# Patient Record
Sex: FEMALE | Race: WHITE | NOT HISPANIC OR LATINO | URBAN - METROPOLITAN AREA
[De-identification: names, ages, dates, MRNs, and addresses within clinical notes are randomized per-mention and may not be internally consistent; named-entity substitution may affect disease eponyms.]

---

## 2023-10-24 ENCOUNTER — HOSPITAL ENCOUNTER (OUTPATIENT)
Dept: RADIOLOGY | Facility: EXTERNAL LOCATION | Age: 58
Discharge: HOME | End: 2023-10-24
Payer: MEDICARE

## 2023-10-25 ENCOUNTER — HOSPITAL ENCOUNTER (OUTPATIENT)
Dept: RADIOLOGY | Facility: HOSPITAL | Age: 58
Discharge: HOME | End: 2023-10-25
Payer: MEDICARE

## 2023-10-25 ENCOUNTER — HOSPITAL ENCOUNTER (OUTPATIENT)
Dept: RADIOLOGY | Facility: HOSPITAL | Age: 58
End: 2023-10-25
Payer: MEDICARE

## 2023-10-25 DIAGNOSIS — N64.89 OTHER SPECIFIED DISORDERS OF BREAST: ICD-10-CM

## 2023-10-25 PROCEDURE — 77061 BREAST TOMOSYNTHESIS UNI: CPT | Mod: LT

## 2023-10-25 PROCEDURE — 77065 DX MAMMO INCL CAD UNI: CPT | Mod: LEFT SIDE | Performed by: RADIOLOGY

## 2023-10-25 PROCEDURE — G0279 TOMOSYNTHESIS, MAMMO: HCPCS | Mod: LEFT SIDE | Performed by: RADIOLOGY

## 2023-11-27 PROBLEM — M23.301 DEGENERATIVE TEAR OF LATERAL MENISCUS OF LEFT KNEE: Status: ACTIVE | Noted: 2018-04-04

## 2023-11-27 PROBLEM — M70.61 TROCHANTERIC BURSITIS OF RIGHT HIP: Status: ACTIVE | Noted: 2023-06-21

## 2023-11-27 PROBLEM — E66.9 OBESITY: Status: ACTIVE | Noted: 2023-11-27

## 2023-11-27 PROBLEM — E66.01 SEVERE OBESITY (BMI >= 40) (MULTI): Status: ACTIVE | Noted: 2023-05-01

## 2023-11-27 PROBLEM — M79.18 MYOFASCIAL PAIN: Status: ACTIVE | Noted: 2023-05-01

## 2023-11-27 PROBLEM — F41.9 ANXIETY: Status: ACTIVE | Noted: 2023-11-27

## 2023-11-27 PROBLEM — M19.90 ARTHRITIS: Status: ACTIVE | Noted: 2023-11-27

## 2023-11-27 PROBLEM — M54.16 LUMBAR RADICULOPATHY: Status: ACTIVE | Noted: 2023-04-30

## 2023-11-27 PROBLEM — G89.29 CHRONIC BACK PAIN: Status: ACTIVE | Noted: 2023-11-27

## 2023-11-27 PROBLEM — L98.9 SKIN LESION OF BACK: Status: ACTIVE | Noted: 2019-01-03

## 2023-11-27 PROBLEM — F17.200 TOBACCO DEPENDENCE: Status: ACTIVE | Noted: 2023-11-27

## 2023-11-27 PROBLEM — E66.9 OBESITY WITH BODY MASS INDEX 30 OR GREATER: Status: ACTIVE | Noted: 2023-11-27

## 2023-11-27 PROBLEM — M48.061 SPINAL STENOSIS OF LUMBAR REGION: Status: ACTIVE | Noted: 2023-04-30

## 2023-11-27 PROBLEM — M79.2 NEUROPATHIC PAIN: Status: ACTIVE | Noted: 2023-05-01

## 2023-11-27 PROBLEM — E78.5 DYSLIPIDEMIA: Status: ACTIVE | Noted: 2023-11-27

## 2023-11-27 PROBLEM — M54.50 CHRONIC LOW BACK PAIN: Status: ACTIVE | Noted: 2023-11-27

## 2023-11-27 PROBLEM — R94.31 ELECTROCARDIOGRAM ABNORMAL: Status: ACTIVE | Noted: 2023-11-27

## 2023-11-27 PROBLEM — F17.210 CONTINUOUS DEPENDENCE ON CIGARETTE SMOKING: Status: ACTIVE | Noted: 2023-04-30

## 2023-11-27 PROBLEM — F17.210 CIGARETTE SMOKER: Status: ACTIVE | Noted: 2023-11-27

## 2023-11-27 PROBLEM — G47.00 INSOMNIA: Status: ACTIVE | Noted: 2023-11-27

## 2023-11-27 PROBLEM — M54.9 CHRONIC BACK PAIN: Status: ACTIVE | Noted: 2023-11-27

## 2023-11-27 PROBLEM — S80.12XA HEMATOMA OF LEFT LOWER EXTREMITY: Status: ACTIVE | Noted: 2019-03-18

## 2023-11-27 PROBLEM — G89.29 CHRONIC LOW BACK PAIN: Status: ACTIVE | Noted: 2023-11-27

## 2023-11-27 PROBLEM — M25.561 RIGHT KNEE PAIN: Status: ACTIVE | Noted: 2023-11-27

## 2023-11-27 PROBLEM — M47.26 OTHER SPONDYLOSIS WITH RADICULOPATHY, LUMBAR REGION: Status: ACTIVE | Noted: 2023-04-30

## 2023-11-27 PROBLEM — M77.02 MEDIAL EPICONDYLITIS OF ELBOW, LEFT: Status: ACTIVE | Noted: 2018-09-05

## 2023-11-27 PROBLEM — D25.0 FIBROIDS, SUBMUCOSAL: Status: ACTIVE | Noted: 2017-11-02

## 2023-11-27 RX ORDER — CHLORHEXIDINE GLUCONATE ORAL RINSE 1.2 MG/ML
SOLUTION DENTAL
COMMUNITY
Start: 2023-08-02 | End: 2023-11-28 | Stop reason: WASHOUT

## 2023-11-27 RX ORDER — LANOLIN ALCOHOL/MO/W.PET/CERES
CREAM (GRAM) TOPICAL
COMMUNITY
Start: 2021-09-07 | End: 2023-11-28 | Stop reason: WASHOUT

## 2023-11-27 RX ORDER — HYDROCODONE BITARTRATE AND ACETAMINOPHEN 5; 325 MG/1; MG/1
TABLET ORAL
COMMUNITY
End: 2023-11-28 | Stop reason: WASHOUT

## 2023-11-27 RX ORDER — IBUPROFEN 200 MG
TABLET ORAL EVERY 24 HOURS
COMMUNITY
Start: 2022-08-04 | End: 2023-11-28 | Stop reason: WASHOUT

## 2023-11-27 RX ORDER — ZOLPIDEM TARTRATE 10 MG/1
1 TABLET ORAL NIGHTLY PRN
COMMUNITY
Start: 2016-07-02 | End: 2024-02-27 | Stop reason: ALTCHOICE

## 2023-11-27 RX ORDER — DULOXETIN HYDROCHLORIDE 60 MG/1
60 CAPSULE, DELAYED RELEASE ORAL
COMMUNITY
Start: 2023-05-25

## 2023-11-27 RX ORDER — SIMETHICONE 80 MG
80 TABLET,CHEWABLE ORAL
COMMUNITY
Start: 2015-07-31 | End: 2024-02-27 | Stop reason: ALTCHOICE

## 2023-11-27 RX ORDER — NIRMATRELVIR AND RITONAVIR 300-100 MG
KIT ORAL
COMMUNITY
Start: 2023-02-06 | End: 2023-11-28 | Stop reason: WASHOUT

## 2023-11-27 RX ORDER — ATORVASTATIN CALCIUM 20 MG/1
20 TABLET, FILM COATED ORAL
COMMUNITY
Start: 2023-05-25 | End: 2023-11-28 | Stop reason: WASHOUT

## 2023-11-27 RX ORDER — BUPROPION HYDROCHLORIDE 150 MG/1
150 TABLET, EXTENDED RELEASE ORAL
COMMUNITY
Start: 2016-07-02 | End: 2023-11-28 | Stop reason: WASHOUT

## 2023-11-27 RX ORDER — ERGOCALCIFEROL 1.25 MG/1
50000 CAPSULE ORAL WEEKLY
COMMUNITY
Start: 2022-09-26

## 2023-11-27 RX ORDER — IBUPROFEN 600 MG/1
600 TABLET ORAL
COMMUNITY
Start: 2016-07-02 | End: 2023-11-28 | Stop reason: WASHOUT

## 2023-11-27 RX ORDER — CLOBETASOL PROPIONATE 0.5 MG/G
CREAM TOPICAL
COMMUNITY
Start: 2023-10-10 | End: 2023-11-28 | Stop reason: WASHOUT

## 2023-11-27 RX ORDER — TRAMADOL HYDROCHLORIDE AND ACETAMINOPHEN 37.5; 325 MG/1; MG/1
1 TABLET, FILM COATED ORAL
COMMUNITY
Start: 2016-06-02 | End: 2023-11-28 | Stop reason: WASHOUT

## 2023-11-27 RX ORDER — DULOXETIN HYDROCHLORIDE 60 MG/1
1 CAPSULE, DELAYED RELEASE ORAL EVERY 24 HOURS
COMMUNITY
End: 2023-11-28 | Stop reason: WASHOUT

## 2023-11-27 RX ORDER — MELOXICAM 15 MG/1
TABLET ORAL EVERY 24 HOURS
COMMUNITY
End: 2023-11-28 | Stop reason: WASHOUT

## 2023-11-27 RX ORDER — KETOROLAC TROMETHAMINE 10 MG/1
1 TABLET, FILM COATED ORAL EVERY 6 HOURS PRN
COMMUNITY
Start: 2023-04-06 | End: 2023-11-28 | Stop reason: WASHOUT

## 2023-11-27 RX ORDER — MELOXICAM 15 MG/1
15 TABLET ORAL
COMMUNITY
Start: 2023-05-25

## 2023-11-27 RX ORDER — AMITRIPTYLINE HYDROCHLORIDE 25 MG/1
25 TABLET, FILM COATED ORAL
COMMUNITY
Start: 2016-07-02 | End: 2023-11-28 | Stop reason: WASHOUT

## 2023-11-27 RX ORDER — COPPER GLUCONATE 2 MG
TABLET ORAL
COMMUNITY
Start: 2020-02-06

## 2023-11-27 RX ORDER — DULOXETIN HYDROCHLORIDE 30 MG/1
CAPSULE, DELAYED RELEASE ORAL
COMMUNITY
Start: 2023-03-29 | End: 2023-11-28 | Stop reason: WASHOUT

## 2023-11-27 RX ORDER — ATORVASTATIN CALCIUM 20 MG/1
TABLET, FILM COATED ORAL EVERY 24 HOURS
COMMUNITY
End: 2024-02-05 | Stop reason: SDUPTHER

## 2023-11-27 RX ORDER — OXYCODONE AND ACETAMINOPHEN 5; 325 MG/1; MG/1
1 TABLET ORAL EVERY 8 HOURS PRN
COMMUNITY
Start: 2023-09-06 | End: 2023-11-28 | Stop reason: WASHOUT

## 2023-11-27 RX ORDER — VARENICLINE TARTRATE 0.5 (11)-1
KIT ORAL
COMMUNITY
End: 2023-11-28 | Stop reason: WASHOUT

## 2023-11-27 RX ORDER — METHYLPREDNISOLONE 4 MG/1
TABLET ORAL
COMMUNITY
Start: 2023-06-21 | End: 2023-11-28 | Stop reason: WASHOUT

## 2023-11-27 RX ORDER — GABAPENTIN 400 MG/1
400 CAPSULE ORAL 3 TIMES DAILY
COMMUNITY
Start: 2023-02-22 | End: 2023-11-28 | Stop reason: WASHOUT

## 2023-11-27 RX ORDER — ERGOCALCIFEROL 1.25 MG/1
50000 CAPSULE ORAL WEEKLY
COMMUNITY
Start: 2022-09-26 | End: 2023-11-28 | Stop reason: WASHOUT

## 2023-11-27 RX ORDER — CYCLOBENZAPRINE HCL 10 MG
10 TABLET ORAL EVERY 8 HOURS PRN
COMMUNITY
Start: 2023-04-06 | End: 2023-11-28 | Stop reason: WASHOUT

## 2023-11-27 RX ORDER — VARENICLINE TARTRATE 1 MG/1
1 TABLET, FILM COATED ORAL 2 TIMES DAILY
COMMUNITY
Start: 2023-08-02

## 2023-11-27 RX ORDER — MIRTAZAPINE 30 MG/1
TABLET, FILM COATED ORAL
COMMUNITY

## 2023-11-27 RX ORDER — PREDNISONE 20 MG/1
TABLET ORAL
COMMUNITY
Start: 2023-01-10 | End: 2023-11-28 | Stop reason: WASHOUT

## 2023-11-27 RX ORDER — SIMVASTATIN 10 MG/1
10 TABLET, FILM COATED ORAL
COMMUNITY
Start: 2016-06-02 | End: 2024-02-27 | Stop reason: ALTCHOICE

## 2023-11-27 RX ORDER — ESCITALOPRAM OXALATE 20 MG/1
20 TABLET ORAL
COMMUNITY
Start: 2023-10-19

## 2023-11-28 RX ORDER — VITAMIN E MIXED 400 UNIT
CAPSULE ORAL DAILY
COMMUNITY

## 2023-11-28 RX ORDER — TIZANIDINE HYDROCHLORIDE 4 MG/1
4 CAPSULE, GELATIN COATED ORAL 3 TIMES DAILY
COMMUNITY
End: 2024-02-27 | Stop reason: ALTCHOICE

## 2023-11-28 RX ORDER — TURMERIC 400 MG
CAPSULE ORAL
COMMUNITY

## 2023-11-28 RX ORDER — MELATONIN 1 MG
TABLET,CHEWABLE ORAL
COMMUNITY

## 2023-11-29 ENCOUNTER — OFFICE VISIT (OUTPATIENT)
Dept: SURGERY | Facility: CLINIC | Age: 58
End: 2023-11-29
Payer: MEDICARE

## 2023-11-29 VITALS
HEIGHT: 65 IN | DIASTOLIC BLOOD PRESSURE: 70 MMHG | HEART RATE: 56 BPM | WEIGHT: 181 LBS | TEMPERATURE: 97.7 F | BODY MASS INDEX: 30.16 KG/M2 | SYSTOLIC BLOOD PRESSURE: 130 MMHG

## 2023-11-29 DIAGNOSIS — R10.13 EPIGASTRIC PAIN: ICD-10-CM

## 2023-11-29 DIAGNOSIS — F17.200 TOBACCO DEPENDENCE: ICD-10-CM

## 2023-11-29 DIAGNOSIS — Z12.11 SCREENING FOR COLON CANCER: Primary | ICD-10-CM

## 2023-11-29 PROCEDURE — 99204 OFFICE O/P NEW MOD 45 MIN: CPT | Performed by: SURGERY

## 2023-11-29 PROCEDURE — 3008F BODY MASS INDEX DOCD: CPT | Performed by: SURGERY

## 2023-11-29 PROCEDURE — 3075F SYST BP GE 130 - 139MM HG: CPT | Performed by: SURGERY

## 2023-11-29 PROCEDURE — 3078F DIAST BP <80 MM HG: CPT | Performed by: SURGERY

## 2023-11-29 RX ORDER — PANTOPRAZOLE SODIUM 40 MG/1
40 TABLET, DELAYED RELEASE ORAL
Qty: 30 TABLET | Refills: 11 | Status: SHIPPED | OUTPATIENT
Start: 2023-11-29 | End: 2024-11-28

## 2023-11-29 ASSESSMENT — ENCOUNTER SYMPTOMS: ABDOMINAL PAIN: 1

## 2023-11-29 NOTE — PATIENT INSTRUCTIONS
I prescribed you an oral medication for your stomach you have a smoker and you also take aspirin which would explain why you are having some pain.  Will schedule you for your colonoscopy and EGD on March 7, 2024.  You will require a 2-day prep for your colonoscopy

## 2023-11-29 NOTE — PROGRESS NOTES
Subjective   Patient ID: Buffy Damon is a 58 y.o. female who presents for abdominal pain.    HPI     This patient presented with epigastric pain she has nonspecific discomfort.  She is a heavy smoker and takes oral aspirin.  She has had no melena.  She has had no other abdominal complaints except for nonspecific abdominal discomfort.  She had a colonoscopy on January 20, 2021 which confirmed a single polyp but a poor prep recommendations made for follow-up colonoscopy in 3 years.    History reviewed. No pertinent past medical history.     Current Outpatient Medications on File Prior to Visit   Medication Sig Dispense Refill    atorvastatin (Lipitor) 20 mg tablet once every 24 hours.      copper gluconate 2 mg tablet       DULoxetine (Cymbalta) 60 mg DR capsule Take 1 capsule (60 mg) by mouth once daily.      ergocalciferol (Vitamin D-2) 1.25 MG (53668 UT) capsule Take 1 capsule (50,000 Units) by mouth once a week.      escitalopram (Lexapro) 20 mg tablet Take 1 tablet (20 mg) by mouth once daily.      melatonin 1 mg tablet,chewable Chew.      meloxicam (Mobic) 15 mg tablet Take 1 tablet (15 mg) by mouth once daily.      mirtazapine (Remeron) 30 mg tablet       simethicone (Mylicon) 80 mg chewable tablet Chew 1 tablet (80 mg).      simvastatin (Zocor) 10 mg tablet Take 1 tablet (10 mg) by mouth.      tiZANidine (Zanaflex) 4 mg capsule Take 1 capsule (4 mg) by mouth 3 times a day.      turmeric 400 mg capsule Take by mouth.      varenicline (Chantix) 1 mg tablet Take 1 tablet (1 mg) by mouth twice a day.      vitamin E 450 mg (1000 unit) capsule Take by mouth once daily.      zolpidem (Ambien) 10 mg tablet Take 1 tablet (10 mg) by mouth as needed at bedtime.      [DISCONTINUED] amitriptyline (Elavil) 25 mg tablet Take 1 tablet (25 mg) by mouth.      [DISCONTINUED] atorvastatin (Lipitor) 20 mg tablet Take 1 tablet (20 mg) by mouth once daily.      [DISCONTINUED] buPROPion SR (Wellbutrin SR) 150 mg 12 hr tablet Take 1  tablet (150 mg) by mouth.      [DISCONTINUED] chlorhexidine (Peridex) 0.12 % solution RINSE WITH 15 ML BY MOUTH NIGHT BEFORE SURGERY AND MORNING BEFORE SUREGERY      [DISCONTINUED] clobetasol (Temovate) 0.05 % cream APPLY 1 APPLICATION TO AFFECTED AREAS UP TO TWICE DAILY MON. - FRI. HOLD WEEKENDS, AVOID FACE      [DISCONTINUED] cyanocobalamin (Vitamin B-12) 1,000 mcg tablet       [DISCONTINUED] cyclobenzaprine (Flexeril) 10 mg tablet Take 1 tablet (10 mg) by mouth every 8 hours if needed.      [DISCONTINUED] DULoxetine (Cymbalta) 30 mg DR capsule TAKE 1 CAPSULE BY MOUTH DAILY FOR 1ST WEEK FROM 2ND WEEK TAKE 60 MG DAILY      [DISCONTINUED] DULoxetine (Cymbalta) 60 mg DR capsule 1 capsule (60 mg) once every 24 hours.      [DISCONTINUED] ergocalciferol (Vitamin D-2) 1.25 MG (42171 UT) capsule Take 1 capsule (50,000 Units) by mouth once a week.      [DISCONTINUED] gabapentin (Neurontin) 400 mg capsule Take 1 capsule (400 mg) by mouth 3 times a day.      [DISCONTINUED] HYDROcodone-acetaminophen (Norco) 5-325 mg tablet       [DISCONTINUED] ibuprofen 600 mg tablet Take 1 tablet (600 mg) by mouth.      [DISCONTINUED] ketorolac (Toradol) 10 mg tablet Take 1 tablet (10 mg) by mouth every 6 hours if needed.      [DISCONTINUED] meloxicam (Mobic) 15 mg tablet once every 24 hours.      [DISCONTINUED] methylPREDNISolone (Medrol Dospak) 4 mg tablets Follow schedule on package instructions      [DISCONTINUED] nicotine (Nicoderm CQ) 14 mg/24 hr patch once every 24 hours.      [DISCONTINUED] oxyCODONE-acetaminophen (Percocet) 5-325 mg tablet Take 1 tablet by mouth every 8 hours if needed.      [DISCONTINUED] Paxlovid 300 mg (150 mg x 2)-100 mg tablet therapy pack take 2 NIRMATRELVIR tablets with 1 RITONAVIR tablet twice a day for 5 days      [DISCONTINUED] predniSONE (Deltasone) 20 mg tablet TAKE 3 TABLETS BY MOUTH ONCE DAILY FOR 3 DAYS, THEN 2 TABS DAILY ...  (REFER TO PRESCRIPTION NOTES).      [DISCONTINUED] traMADoL-acetaminophen  (UltraCET) 37.5-325 mg tablet Take 1 tablet by mouth.      [DISCONTINUED] varenicline (Chantix JANE) 0.5 mg (11)- 1 mg (42) tablet TAKE 1 TABLET (0.5MG) BY MOUTH ONCE DAILY FOR 3 DAYS, THEN 1 TAB ...  (REFER TO PRESCRIPTION NOTES).       No current facility-administered medications on file prior to visit.        Review of Systems   Gastrointestinal:  Positive for abdominal pain.       Vitals:    11/29/23 1050   BP: 130/70   Pulse: 56   Temp: 36.5 °C (97.7 °F)        Objective     Physical Exam  Vitals reviewed. Exam conducted with a chaperone present.   Constitutional:       Appearance: Normal appearance.   HENT:      Head: Normocephalic.      Nose: Nose normal.      Mouth/Throat:      Pharynx: Oropharynx is clear.   Cardiovascular:      Rate and Rhythm: Normal rate and regular rhythm.      Heart sounds: Normal heart sounds.   Pulmonary:      Effort: Pulmonary effort is normal.      Breath sounds: Normal breath sounds.   Abdominal:      General: Abdomen is flat.      Palpations: Abdomen is soft. There is no mass.      Tenderness: There is abdominal tenderness in the epigastric area. There is no guarding.      Hernia: No hernia is present.   Musculoskeletal:         General: Normal range of motion.      Cervical back: Normal range of motion.   Skin:     General: Skin is warm.   Neurological:      General: No focal deficit present.   Psychiatric:         Mood and Affect: Mood normal.         Problem List Items Addressed This Visit       Tobacco dependence    Epigastric pain - Primary        Assessment/Plan   Recommend smoking cessation.  Trial of oral Protonix.  EGD colonoscopy March 7, 2024.    Attila Patel MD

## 2023-12-12 ENCOUNTER — TELEPHONE (OUTPATIENT)
Dept: CARDIOLOGY | Facility: CLINIC | Age: 58
End: 2023-12-12
Payer: MEDICARE

## 2024-02-05 ENCOUNTER — OFFICE VISIT (OUTPATIENT)
Dept: CARDIOLOGY | Facility: CLINIC | Age: 59
End: 2024-02-05
Payer: MEDICARE

## 2024-02-05 VITALS
RESPIRATION RATE: 18 BRPM | WEIGHT: 179.4 LBS | DIASTOLIC BLOOD PRESSURE: 67 MMHG | HEART RATE: 54 BPM | OXYGEN SATURATION: 98 % | TEMPERATURE: 98.9 F | SYSTOLIC BLOOD PRESSURE: 130 MMHG | HEIGHT: 62 IN | BODY MASS INDEX: 33.01 KG/M2

## 2024-02-05 DIAGNOSIS — R94.31 ELECTROCARDIOGRAM ABNORMAL: ICD-10-CM

## 2024-02-05 DIAGNOSIS — E78.5 DYSLIPIDEMIA: ICD-10-CM

## 2024-02-05 DIAGNOSIS — R94.31 ABNORMAL EKG: Primary | ICD-10-CM

## 2024-02-05 DIAGNOSIS — E78.2 MIXED HYPERLIPIDEMIA: ICD-10-CM

## 2024-02-05 DIAGNOSIS — I10 PRIMARY HYPERTENSION: ICD-10-CM

## 2024-02-05 PROBLEM — F41.9 ANXIETY DISORDER, UNSPECIFIED: Status: ACTIVE | Noted: 2023-08-10

## 2024-02-05 PROCEDURE — 93000 ELECTROCARDIOGRAM COMPLETE: CPT | Performed by: INTERNAL MEDICINE

## 2024-02-05 PROCEDURE — 3078F DIAST BP <80 MM HG: CPT | Performed by: INTERNAL MEDICINE

## 2024-02-05 PROCEDURE — 99213 OFFICE O/P EST LOW 20 MIN: CPT | Performed by: INTERNAL MEDICINE

## 2024-02-05 PROCEDURE — 3008F BODY MASS INDEX DOCD: CPT | Performed by: INTERNAL MEDICINE

## 2024-02-05 PROCEDURE — 3075F SYST BP GE 130 - 139MM HG: CPT | Performed by: INTERNAL MEDICINE

## 2024-02-05 RX ORDER — CETIRIZINE HYDROCHLORIDE 10 MG/1
10 TABLET ORAL NIGHTLY
COMMUNITY
Start: 2024-02-02 | End: 2024-02-27 | Stop reason: ALTCHOICE

## 2024-02-05 RX ORDER — IBUPROFEN 600 MG/1
600 TABLET ORAL 3 TIMES DAILY
COMMUNITY
Start: 2024-02-02

## 2024-02-05 RX ORDER — ATORVASTATIN CALCIUM 40 MG/1
40 TABLET, FILM COATED ORAL DAILY
COMMUNITY
Start: 2024-01-21

## 2024-02-05 RX ORDER — FLUTICASONE PROPIONATE 50 MCG
2 SPRAY, SUSPENSION (ML) NASAL DAILY
COMMUNITY
Start: 2024-02-02 | End: 2024-02-27 | Stop reason: ALTCHOICE

## 2024-02-05 RX ORDER — CALCIPOTRIENE 50 UG/G
1 CREAM TOPICAL 2 TIMES DAILY
COMMUNITY
Start: 2023-12-12 | End: 2024-02-27 | Stop reason: ALTCHOICE

## 2024-02-05 ASSESSMENT — PATIENT HEALTH QUESTIONNAIRE - PHQ9
SUM OF ALL RESPONSES TO PHQ9 QUESTIONS 1 AND 2: 0
1. LITTLE INTEREST OR PLEASURE IN DOING THINGS: NOT AT ALL
2. FEELING DOWN, DEPRESSED OR HOPELESS: NOT AT ALL

## 2024-02-05 ASSESSMENT — PAIN SCALES - GENERAL: PAINLEVEL: 0-NO PAIN

## 2024-02-05 NOTE — PROGRESS NOTES
History of present illness:  This is a very pleasant 58-year-old female with history of for hypertension, hyperlipidemia, smoking.  Patient follows up in my office on abnormal EKG.  Underwent lumbar laminectomy few months ago which was uneventful.  At that time I cleared her for surgery.  However her EKG was showing inferior Q waves.  Patient reports shortness of breath with moderate activity.  Unable to exercise as much as she can but she has been recovering very good from her back surgery.  Currently on statin.  Past Medical History:   Diagnosis Date    Dyslipidemia 11/27/2023    Electrocardiogram abnormal 11/27/2023    Hyperlipidemia 10/05/2007    Primary hypertension 01/05/2004       Past Surgical History:   Procedure Laterality Date    BACK SURGERY      CHOLECYSTECTOMY      HYSTERECTOMY         Allergies   Allergen Reactions    Morphine Other, Nausea And Vomiting and Nausea/vomiting        reports that she has been smoking cigarettes. She has a 15.00 pack-year smoking history. She has been exposed to tobacco smoke. She has never used smokeless tobacco. She reports that she does not currently use alcohol. She reports that she does not currently use drugs.    Family History   Problem Relation Name Age of Onset    No Known Problems Mother      No Known Problems Father      Cervical cancer Sister      Breast cancer Paternal Grandmother Dania lopez     Cancer Paternal Grandmother Dania lopez     Diabetes type I Maternal Grandmother Shanthiraven wyattarvind        Patient's Medications   New Prescriptions    No medications on file   Previous Medications    ATORVASTATIN (LIPITOR) 40 MG TABLET    Take 1 tablet (40 mg) by mouth once daily.    CALCIPOTRIENE (DOVONEX) 0.005 % CREAM    Apply 1 Application topically 2 times a day.    CETIRIZINE (ZYRTEC) 10 MG TABLET    Take 1 tablet (10 mg) by mouth once daily at bedtime.    COPPER GLUCONATE 2 MG TABLET        DULOXETINE (CYMBALTA) 60 MG DR CAPSULE    Take 1 capsule (60 mg) by mouth  once daily.    ERGOCALCIFEROL (VITAMIN D-2) 1.25 MG (88084 UT) CAPSULE    Take 1 capsule (50,000 Units) by mouth once a week.    ESCITALOPRAM (LEXAPRO) 20 MG TABLET    Take 1 tablet (20 mg) by mouth once daily.    FLUTICASONE (FLONASE) 50 MCG/ACTUATION NASAL SPRAY    Administer 2 sprays into each nostril once daily.    IBUPROFEN 600 MG TABLET    Take 1 tablet (600 mg) by mouth 3 times a day. TAKE 1 TABLET BY MOUTH THREE TIMES DAILY with food or milk THREE TIMES DAILY AS NEEDED for TEN days    MELATONIN 1 MG TABLET,CHEWABLE    Chew.    MELOXICAM (MOBIC) 15 MG TABLET    Take 1 tablet (15 mg) by mouth once daily.    MIRTAZAPINE (REMERON) 30 MG TABLET        PANTOPRAZOLE (PROTONIX) 40 MG EC TABLET    Take 1 tablet (40 mg) by mouth once daily in the morning. Take before meals. Do not crush, chew, or split.    SIMETHICONE (MYLICON) 80 MG CHEWABLE TABLET    Chew 1 tablet (80 mg).    SIMVASTATIN (ZOCOR) 10 MG TABLET    Take 1 tablet (10 mg) by mouth.    TIZANIDINE (ZANAFLEX) 4 MG CAPSULE    Take 1 capsule (4 mg) by mouth 3 times a day.    TURMERIC 400 MG CAPSULE    Take by mouth.    VARENICLINE (CHANTIX) 1 MG TABLET    Take 1 tablet (1 mg) by mouth twice a day.    VITAMIN E 450 MG (1000 UNIT) CAPSULE    Take by mouth once daily.    ZOLPIDEM (AMBIEN) 10 MG TABLET    Take 1 tablet (10 mg) by mouth as needed at bedtime.   Modified Medications    No medications on file   Discontinued Medications    ATORVASTATIN (LIPITOR) 20 MG TABLET    once every 24 hours.       Objective   Physical Exam  General: Patient in no acute distress   HEENT: Atraumatic normocephalic.  Neck: Supple, jugular venous pressure within normal limit.  No bruits  Lungs: Clear to auscultation bilaterally  Cardiovascular: Regular rate and rhythm, normal heart sounds, no murmurs rubs or gallops  Abdomen: Soft nontender nondistended.  Normal bowel sounds.  Extremities: Warm to touch, no edema.        Lab Review   No visits with results within 2 Month(s) from this  visit.   Latest known visit with results is:   Legacy Encounter on 08/02/2023   Component Date Value    Glucose 08/02/2023 88     Urea Nitrogen 08/02/2023 10     Creatinine 08/02/2023 0.8     Urea Nitrogen/Creatinine* 08/02/2023 12.5     Sodium 08/02/2023 144     Potassium 08/02/2023 5.0     Chloride 08/02/2023 106     Bicarbonate 08/02/2023 28     Anion Gap 08/02/2023 10     Calcium 08/02/2023 9.4     ESTIMATED GFR 08/02/2023 87     Differential Type 08/02/2023 AUTO DIFF     Immature Granulocyte %, * 08/02/2023 0.20     Neutrophil 08/02/2023 54.80     Lymphocytes % 08/02/2023 38.60     Monocytes % 08/02/2023 5.10     Eosinophil 08/02/2023 1.00     Basophils % 08/02/2023 0.30     Immature Granulocytes Ab* 08/02/2023 0.01     Neutrophils Absolute 08/02/2023 3.33     Lymphocytes Absolute 08/02/2023 2.34     Monocytes Absolute 08/02/2023 0.31     Eosinophils Absolute 08/02/2023 0.06     Basophils Absolute 08/02/2023 0.02     WBC 08/02/2023 6.1     RBC 08/02/2023 4.26     Hemoglobin 08/02/2023 13.1     Hematocrit 08/02/2023 40.2     MCV 08/02/2023 94.4     MCH 08/02/2023 30.8     MCHC 08/02/2023 32.6     RDW-SD 08/02/2023 43.7     RDW-CV 08/02/2023 12.8     Platelets 08/02/2023 300     MPV 08/02/2023 11.8     nRBC 08/02/2023 0     ABSOLUTE NEUTROPHIL CALC* 08/02/2023 3.33     Hemoglobin A1C 08/02/2023 5.6     Specimen Source 08/02/2023 NASAL     Meth. Resistant Staph By* 08/02/2023 NEGATIVE     COMPONENT CODE 08/02/2023 RED CELL GROUP     Units Ordered 08/02/2023 0     Specimen Expiration 08/02/2023 08/11/2023     ABO GROUP (TYPE) IN BLOOD 08/02/2023 O  POSITIVE     ANTIBODY SCREEN 08/02/2023 NEGATIVE     Arm Band Number 08/02/2023 [id number]     Surgery Date 08/02/2023 08 08 23 TRIPOINT     Test Ordered 08/02/2023 TYPE AND SCREEN     Pt Transfusion History 08/02/2023                      Value:BLOOD BANK RECORD SEARCH COMPLETED  NO PREVIOUS TRANSFUSIONS IN   LIFETIME  NO PREVIOUS RECORD Performed at [address]  [place]   [facility] [id number]          Assessment/Plan   Patient Active Problem List   Diagnosis    Anxiety disorder, unspecified    Arthritis    Chronic back pain    Chronic low back pain    Smoker    Continuous dependence on cigarette smoking    Depression    Dyslipidemia    Electrocardiogram abnormal    Fibroids, submucosal    Headache    Hematoma of left lower extremity    Hyperlipidemia    Insomnia    Medial epicondylitis of elbow, left    Myofascial pain    Neuropathic pain    Obesity with body mass index 30 or greater    Obesity    Severe obesity (BMI >= 40) (CMS/HCC)    Other spondylosis with radiculopathy, lumbar region    Pain in left knee    Right knee pain    Patellofemoral stress syndrome of left knee    Primary hypertension    Primary osteoarthritis of left knee    Degenerative tear of lateral meniscus of left knee    Seizures (CMS/HCC)    Lumbar radiculopathy    Skin lesion of back    Spinal stenosis of lumbar region    Tobacco dependence    Trochanteric bursitis of right hip    Epigastric pain      This is a very pleasant 58-year-old female with history of for hypertension, hyperlipidemia, smoking.  Patient follows up in my office on abnormal EKG.  Underwent lumbar laminectomy few months ago which was uneventful.  At that time I cleared her for surgery.  However her EKG was showing inferior Q waves.  Patient reports shortness of breath with moderate activity.  Unable to exercise as much as she can but she has been recovering very good from her back surgery.  Currently on statin.  Blood pressure and heart rate well-controlled.  I will arrange for treadmill stress test for stratification abnormal normal EKG.  Will also obtain coronary calcium score to assess her cardiovascular risk and need to increase her statin or put her on aspirin.  Will follow-up in 6 months.  Discussed with patient lifestyle modification and still smoking cessation.    Dior Soto MD

## 2024-02-15 ENCOUNTER — HOSPITAL ENCOUNTER (OUTPATIENT)
Dept: CARDIOLOGY | Facility: HOSPITAL | Age: 59
Discharge: HOME | End: 2024-02-15
Payer: MEDICARE

## 2024-02-15 DIAGNOSIS — R94.31 ABNORMAL EKG: ICD-10-CM

## 2024-02-15 DIAGNOSIS — I10 PRIMARY HYPERTENSION: ICD-10-CM

## 2024-02-15 PROCEDURE — 93016 CV STRESS TEST SUPVJ ONLY: CPT | Performed by: INTERNAL MEDICINE

## 2024-02-15 PROCEDURE — 93017 CV STRESS TEST TRACING ONLY: CPT

## 2024-02-15 PROCEDURE — 93018 CV STRESS TEST I&R ONLY: CPT | Performed by: INTERNAL MEDICINE

## 2024-02-22 ENCOUNTER — TELEPHONE (OUTPATIENT)
Dept: CARDIOLOGY | Facility: CLINIC | Age: 59
End: 2024-02-22
Payer: MEDICARE

## 2024-02-22 NOTE — TELEPHONE ENCOUNTER
----- Message from Dior Soto MD sent at 2/21/2024 10:54 AM EST -----  Tell patient that her stress test was okay.  Will do also the coronary calcium score we will call her with the results.  ----- Message -----  From: Interface, [employer] - Cardiology Results In  Sent: 2/17/2024   6:06 AM EST  To: Dior Soto MD

## 2024-02-27 ENCOUNTER — PRE-ADMISSION TESTING (OUTPATIENT)
Dept: PREADMISSION TESTING | Facility: HOSPITAL | Age: 59
End: 2024-02-27
Payer: MEDICARE

## 2024-02-27 ENCOUNTER — ANESTHESIA EVENT (OUTPATIENT)
Dept: ANESTHESIOLOGY | Facility: HOSPITAL | Age: 59
End: 2024-02-27

## 2024-02-27 VITALS — WEIGHT: 170 LBS | BODY MASS INDEX: 31.09 KG/M2

## 2024-02-27 ASSESSMENT — DUKE ACTIVITY SCORE INDEX (DASI)
CAN YOU PARTICIPATE IN STRENOUS SPORTS LIKE SWIMMING, SINGLES TENNIS, FOOTBALL, BASKETBALL, OR SKIING: NO
CAN YOU PARTICIPATE IN MODERATE RECREATIONAL ACTIVITIES LIKE GOLF, BOWLING, DANCING, DOUBLES TENNIS OR THROWING A BASEBALL OR FOOTBALL: YES
CAN YOU DO YARD WORK LIKE RAKING LEAVES, WEEDING OR PUSHING A MOWER: YES
CAN YOU CLIMB A FLIGHT OF STAIRS OR WALK UP A HILL: YES
CAN YOU WALK INDOORS, SUCH AS AROUND YOUR HOUSE: YES
CAN YOU TAKE CARE OF YOURSELF (EAT, DRESS, BATHE, OR USE TOILET): YES
CAN YOU HAVE SEXUAL RELATIONS: YES
CAN YOU RUN A SHORT DISTANCE: YES
CAN YOU DO LIGHT WORK AROUND THE HOUSE LIKE DUSTING OR WASHING DISHES: YES
CAN YOU DO HEAVY WORK AROUND THE HOUSE LIKE SCRUBBING FLOORS OR LIFTING AND MOVING HEAVY FURNITURE: YES
CAN YOU WALK A BLOCK OR TWO ON LEVEL GROUND: YES
CAN YOU DO MODERATE WORK AROUND THE HOUSE LIKE VACUUMING, SWEEPING FLOORS OR CARRYING GROCERIES: YES
DASI METS SCORE: 9
TOTAL_SCORE: 50.7

## 2024-02-27 NOTE — ANESTHESIA PREPROCEDURE EVALUATION
Patient: Buffy Damon    Procedure Information    Date: 02/27/24  Reason: PAT         Relevant Problems   Cardiovascular   (+) Electrocardiogram abnormal   (+) Hyperlipidemia   (+) Primary hypertension      Endocrine   (+) Obesity      Neuro/Psych   (+) Anxiety disorder, unspecified   (+) Depression   (+) Lumbar radiculopathy   (+) Seizures (CMS/HCC)      Musculoskeletal   (+) Chronic low back pain   (+) Degenerative tear of lateral meniscus of left knee   (+) Other spondylosis with radiculopathy, lumbar region   (+) Primary osteoarthritis of left knee   (+) Spinal stenosis of lumbar region      Other   (+) Arthritis       Clinical information reviewed:                 Chart reviewed.  Patient was previously cleared for surgery by cardiology for laminectomy a few months ago.  Stress test performed for SOB/inferior Q waves and was negative.  CT cardiac calcium score pending.  Follow up with cardiology in 6 months.  No additional clearance ordered.      2/17/24 Stress test  Summary:   1. Good effort tolerance 10 METs.   2. Normal HR and BP response.   3. Normal baseline ECG with nonspecific changes with no fresh change during exercise.   4. Negative stress test.   5. Adequate level of stress achieved.    There were no vitals filed for this visit.    Past Surgical History:   Procedure Laterality Date    BACK SURGERY      CHOLECYSTECTOMY      HYSTERECTOMY       Past Medical History:   Diagnosis Date    Dyslipidemia 11/27/2023    Electrocardiogram abnormal 11/27/2023    Hyperlipidemia 10/05/2007    Primary hypertension 01/05/2004       Current Outpatient Medications:     atorvastatin (Lipitor) 40 mg tablet, Take 1 tablet (40 mg) by mouth once daily., Disp: , Rfl:     calcipotriene (Dovonex) 0.005 % cream, Apply 1 Application topically 2 times a day., Disp: , Rfl:     cetirizine (ZyrTEC) 10 mg tablet, Take 1 tablet (10 mg) by mouth once daily at bedtime., Disp: , Rfl:     copper gluconate 2 mg tablet, , Disp: , Rfl:      DULoxetine (Cymbalta) 60 mg DR capsule, Take 1 capsule (60 mg) by mouth once daily., Disp: , Rfl:     ergocalciferol (Vitamin D-2) 1.25 MG (54544 UT) capsule, Take 1 capsule (50,000 Units) by mouth once a week., Disp: , Rfl:     escitalopram (Lexapro) 20 mg tablet, Take 1 tablet (20 mg) by mouth once daily., Disp: , Rfl:     fluticasone (Flonase) 50 mcg/actuation nasal spray, Administer 2 sprays into each nostril once daily., Disp: , Rfl:     ibuprofen 600 mg tablet, Take 1 tablet (600 mg) by mouth 3 times a day. TAKE 1 TABLET BY MOUTH THREE TIMES DAILY with food or milk THREE TIMES DAILY AS NEEDED for TEN days, Disp: , Rfl:     melatonin 1 mg tablet,chewable, Chew., Disp: , Rfl:     meloxicam (Mobic) 15 mg tablet, Take 1 tablet (15 mg) by mouth once daily., Disp: , Rfl:     mirtazapine (Remeron) 30 mg tablet, , Disp: , Rfl:     pantoprazole (Protonix) 40 mg EC tablet, Take 1 tablet (40 mg) by mouth once daily in the morning. Take before meals. Do not crush, chew, or split., Disp: 30 tablet, Rfl: 11    simethicone (Mylicon) 80 mg chewable tablet, Chew 1 tablet (80 mg)., Disp: , Rfl:     simvastatin (Zocor) 10 mg tablet, Take 1 tablet (10 mg) by mouth., Disp: , Rfl:     tiZANidine (Zanaflex) 4 mg capsule, Take 1 capsule (4 mg) by mouth 3 times a day., Disp: , Rfl:     turmeric 400 mg capsule, Take by mouth., Disp: , Rfl:     varenicline (Chantix) 1 mg tablet, Take 1 tablet (1 mg) by mouth twice a day., Disp: , Rfl:     vitamin E 450 mg (1000 unit) capsule, Take by mouth once daily., Disp: , Rfl:     zolpidem (Ambien) 10 mg tablet, Take 1 tablet (10 mg) by mouth as needed at bedtime., Disp: , Rfl:   Prior to Admission medications    Medication Sig Start Date End Date Taking? Authorizing Provider   atorvastatin (Lipitor) 40 mg tablet Take 1 tablet (40 mg) by mouth once daily. 1/21/24   Historical Provider, MD   calcipotriene (Dovonex) 0.005 % cream Apply 1 Application topically 2 times a day. 12/12/23   Historical  Provider, MD   cetirizine (ZyrTEC) 10 mg tablet Take 1 tablet (10 mg) by mouth once daily at bedtime. 2/2/24   Historical Provider, MD   copper gluconate 2 mg tablet  2/6/20   Historical Provider, MD   DULoxetine (Cymbalta) 60 mg DR capsule Take 1 capsule (60 mg) by mouth once daily. 5/25/23   Historical Provider, MD   ergocalciferol (Vitamin D-2) 1.25 MG (70701 UT) capsule Take 1 capsule (50,000 Units) by mouth once a week. 9/26/22   Historical Provider, MD   escitalopram (Lexapro) 20 mg tablet Take 1 tablet (20 mg) by mouth once daily. 10/19/23   Historical Provider, MD   fluticasone (Flonase) 50 mcg/actuation nasal spray Administer 2 sprays into each nostril once daily. 2/2/24   Historical Provider, MD   ibuprofen 600 mg tablet Take 1 tablet (600 mg) by mouth 3 times a day. TAKE 1 TABLET BY MOUTH THREE TIMES DAILY with food or milk THREE TIMES DAILY AS NEEDED for TEN days 2/2/24   Historical Provider, MD   melatonin 1 mg tablet,chewable Chew.    Historical Provider, MD   meloxicam (Mobic) 15 mg tablet Take 1 tablet (15 mg) by mouth once daily. 5/25/23   Historical Provider, MD   mirtazapine (Remeron) 30 mg tablet     Historical Provider, MD   pantoprazole (Protonix) 40 mg EC tablet Take 1 tablet (40 mg) by mouth once daily in the morning. Take before meals. Do not crush, chew, or split. 11/29/23 11/28/24  Attila Patel MD   simethicone (Mylicon) 80 mg chewable tablet Chew 1 tablet (80 mg). 7/31/15   Historical Provider, MD   simvastatin (Zocor) 10 mg tablet Take 1 tablet (10 mg) by mouth. 6/2/16   Historical Provider, MD   tiZANidine (Zanaflex) 4 mg capsule Take 1 capsule (4 mg) by mouth 3 times a day.    Historical Provider, MD   turmeric 400 mg capsule Take by mouth.    Historical Provider, MD   varenicline (Chantix) 1 mg tablet Take 1 tablet (1 mg) by mouth twice a day. 8/2/23   Historical Provider, MD   vitamin E 450 mg (1000 unit) capsule Take by mouth once daily.    Historical Provider, MD   zolpidem  "(Ambien) 10 mg tablet Take 1 tablet (10 mg) by mouth as needed at bedtime. 7/2/16   Historical Provider, MD     Allergies   Allergen Reactions    Morphine Other, Nausea And Vomiting and Nausea/vomiting     Social History     Tobacco Use    Smoking status: Every Day     Packs/day: 1.00     Years: 15.00     Additional pack years: 0.00     Total pack years: 15.00     Types: Cigarettes     Passive exposure: Current    Smokeless tobacco: Never    Tobacco comments:     Pt is ready to quit, she states she currently vapes    Substance Use Topics    Alcohol use: Not Currently         Chemistry    Lab Results   Component Value Date/Time     08/02/2023 1057    K 5.0 08/02/2023 1057     08/02/2023 1057    CO2 28 08/02/2023 1057    BUN 10 08/02/2023 1057    CREATININE 0.8 08/02/2023 1057    Lab Results   Component Value Date/Time    CALCIUM 9.4 08/02/2023 1057          Lab Results   Component Value Date/Time    WBC 6.1 08/02/2023 1057    HGB 13.1 08/02/2023 1057    HCT 40.2 08/02/2023 1057     08/02/2023 1057     No results found for: \"PROTIME\", \"PTT\", \"INR\"  Encounter Date: 02/05/24   ECG 12 lead (Clinic Performed)    Narrative    Sinus bradycardia.  Nonspecific ST-T wave abnormalities     No results found for this or any previous visit from the past 1095 days.       NPO Detail:  No data recorded     PHYSICAL EXAM    Anesthesia Plan    History of general anesthesia?: yes  History of complications of general anesthesia?: no    ASA 3     MAC         "

## 2024-02-27 NOTE — PREPROCEDURE INSTRUCTIONS
Follow SUTAB prep.    Do not eat ANY SOLID FOOD, chew gum, candy or smoke after midnight.    You may have CLEAR liquids up to THREE hours prior to your surgery time.    We will call you between 1:00-3:00 pm the day before your surgery with your arrival time.  Please come directly to the 2nd floor Outpatient Department on the day of your procedure.  Please use the back/ER entrance.     If you have any questions, please call [phone].    Thank you.

## 2024-03-03 ENCOUNTER — PREP FOR PROCEDURE (OUTPATIENT)
Dept: SURGERY | Facility: HOSPITAL | Age: 59
End: 2024-03-03
Payer: MEDICARE

## 2024-03-03 RX ORDER — ONDANSETRON HYDROCHLORIDE 2 MG/ML
4 INJECTION, SOLUTION INTRAVENOUS ONCE AS NEEDED
Status: CANCELLED | OUTPATIENT
Start: 2024-03-03

## 2024-03-03 RX ORDER — SODIUM CHLORIDE, SODIUM LACTATE, POTASSIUM CHLORIDE, CALCIUM CHLORIDE 600; 310; 30; 20 MG/100ML; MG/100ML; MG/100ML; MG/100ML
100 INJECTION, SOLUTION INTRAVENOUS CONTINUOUS
Status: CANCELLED | OUTPATIENT
Start: 2024-03-03

## 2024-03-05 ENCOUNTER — HOSPITAL ENCOUNTER (OUTPATIENT)
Dept: RADIOLOGY | Facility: HOSPITAL | Age: 59
Discharge: HOME | End: 2024-03-05
Payer: MEDICARE

## 2024-03-05 DIAGNOSIS — E78.2 MIXED HYPERLIPIDEMIA: ICD-10-CM

## 2024-03-05 PROCEDURE — 75571 CT HRT W/O DYE W/CA TEST: CPT

## 2024-03-07 ENCOUNTER — HOSPITAL ENCOUNTER (OUTPATIENT)
Dept: GASTROENTEROLOGY | Facility: HOSPITAL | Age: 59
Setting detail: OUTPATIENT SURGERY
Discharge: HOME | End: 2024-03-07
Payer: MEDICARE

## 2024-03-07 ENCOUNTER — ANESTHESIA EVENT (OUTPATIENT)
Dept: GASTROENTEROLOGY | Facility: HOSPITAL | Age: 59
End: 2024-03-07
Payer: MEDICARE

## 2024-03-07 ENCOUNTER — ANESTHESIA (OUTPATIENT)
Dept: GASTROENTEROLOGY | Facility: HOSPITAL | Age: 59
End: 2024-03-07
Payer: MEDICARE

## 2024-03-07 VITALS
OXYGEN SATURATION: 100 % | WEIGHT: 170 LBS | BODY MASS INDEX: 31.28 KG/M2 | HEART RATE: 71 BPM | TEMPERATURE: 97.3 F | RESPIRATION RATE: 17 BRPM | DIASTOLIC BLOOD PRESSURE: 67 MMHG | HEIGHT: 62 IN | SYSTOLIC BLOOD PRESSURE: 122 MMHG

## 2024-03-07 DIAGNOSIS — R10.13 EPIGASTRIC PAIN: ICD-10-CM

## 2024-03-07 DIAGNOSIS — Z12.11 SCREENING FOR COLON CANCER: ICD-10-CM

## 2024-03-07 PROCEDURE — 2500000004 HC RX 250 GENERAL PHARMACY W/ HCPCS (ALT 636 FOR OP/ED): Mod: SE | Performed by: SURGERY

## 2024-03-07 PROCEDURE — 43239 EGD BIOPSY SINGLE/MULTIPLE: CPT | Performed by: SURGERY

## 2024-03-07 PROCEDURE — 2500000002 HC RX 250 W HCPCS SELF ADMINISTERED DRUGS (ALT 637 FOR MEDICARE OP, ALT 636 FOR OP/ED): Mod: SE | Performed by: NURSE ANESTHETIST, CERTIFIED REGISTERED

## 2024-03-07 PROCEDURE — 3700000002 HC GENERAL ANESTHESIA TIME - EACH INCREMENTAL 1 MINUTE

## 2024-03-07 PROCEDURE — 45380 COLONOSCOPY AND BIOPSY: CPT | Performed by: SURGERY

## 2024-03-07 PROCEDURE — 88305 TISSUE EXAM BY PATHOLOGIST: CPT | Performed by: PATHOLOGY

## 2024-03-07 PROCEDURE — 2500000004 HC RX 250 GENERAL PHARMACY W/ HCPCS (ALT 636 FOR OP/ED): Mod: SE | Performed by: NURSE ANESTHETIST, CERTIFIED REGISTERED

## 2024-03-07 PROCEDURE — 7100000010 HC PHASE TWO TIME - EACH INCREMENTAL 1 MINUTE

## 2024-03-07 PROCEDURE — 88305 TISSUE EXAM BY PATHOLOGIST: CPT | Mod: TC,SUR,GENLAB | Performed by: SURGERY

## 2024-03-07 PROCEDURE — 88342 IMHCHEM/IMCYTCHM 1ST ANTB: CPT | Performed by: PATHOLOGY

## 2024-03-07 PROCEDURE — 3700000001 HC GENERAL ANESTHESIA TIME - INITIAL BASE CHARGE

## 2024-03-07 PROCEDURE — 7100000009 HC PHASE TWO TIME - INITIAL BASE CHARGE

## 2024-03-07 PROCEDURE — 2500000005 HC RX 250 GENERAL PHARMACY W/O HCPCS: Mod: SE | Performed by: NURSE ANESTHETIST, CERTIFIED REGISTERED

## 2024-03-07 RX ORDER — FENTANYL CITRATE 50 UG/ML
INJECTION, SOLUTION INTRAMUSCULAR; INTRAVENOUS AS NEEDED
Status: DISCONTINUED | OUTPATIENT
Start: 2024-03-07 | End: 2024-03-07

## 2024-03-07 RX ORDER — SODIUM CHLORIDE, SODIUM LACTATE, POTASSIUM CHLORIDE, CALCIUM CHLORIDE 600; 310; 30; 20 MG/100ML; MG/100ML; MG/100ML; MG/100ML
100 INJECTION, SOLUTION INTRAVENOUS CONTINUOUS
Status: DISCONTINUED | OUTPATIENT
Start: 2024-03-07 | End: 2024-03-08 | Stop reason: HOSPADM

## 2024-03-07 RX ORDER — ONDANSETRON HYDROCHLORIDE 2 MG/ML
4 INJECTION, SOLUTION INTRAVENOUS ONCE AS NEEDED
Status: DISCONTINUED | OUTPATIENT
Start: 2024-03-07 | End: 2024-03-08 | Stop reason: HOSPADM

## 2024-03-07 RX ORDER — ALBUTEROL SULFATE 90 UG/1
AEROSOL, METERED RESPIRATORY (INHALATION) AS NEEDED
Status: DISCONTINUED | OUTPATIENT
Start: 2024-03-07 | End: 2024-03-07

## 2024-03-07 RX ORDER — PROPOFOL 10 MG/ML
INJECTION, EMULSION INTRAVENOUS AS NEEDED
Status: DISCONTINUED | OUTPATIENT
Start: 2024-03-07 | End: 2024-03-07

## 2024-03-07 RX ORDER — LIDOCAINE HYDROCHLORIDE 20 MG/ML
INJECTION, SOLUTION EPIDURAL; INFILTRATION; INTRACAUDAL; PERINEURAL AS NEEDED
Status: DISCONTINUED | OUTPATIENT
Start: 2024-03-07 | End: 2024-03-07

## 2024-03-07 RX ADMIN — PROPOFOL 50 MG: 10 INJECTION, EMULSION INTRAVENOUS at 07:50

## 2024-03-07 RX ADMIN — PROPOFOL 50 MG: 10 INJECTION, EMULSION INTRAVENOUS at 07:36

## 2024-03-07 RX ADMIN — SODIUM CHLORIDE, POTASSIUM CHLORIDE, SODIUM LACTATE AND CALCIUM CHLORIDE 100 ML/HR: 600; 310; 30; 20 INJECTION, SOLUTION INTRAVENOUS at 06:38

## 2024-03-07 RX ADMIN — ALBUTEROL SULFATE 2 PUFF: 90 AEROSOL, METERED RESPIRATORY (INHALATION) at 07:28

## 2024-03-07 RX ADMIN — FENTANYL CITRATE 25 MCG: 50 INJECTION, SOLUTION INTRAMUSCULAR; INTRAVENOUS at 07:33

## 2024-03-07 RX ADMIN — PROPOFOL 50 MG: 10 INJECTION, EMULSION INTRAVENOUS at 07:54

## 2024-03-07 RX ADMIN — PROPOFOL 50 MG: 10 INJECTION, EMULSION INTRAVENOUS at 08:02

## 2024-03-07 RX ADMIN — PROPOFOL 50 MG: 10 INJECTION, EMULSION INTRAVENOUS at 07:46

## 2024-03-07 RX ADMIN — PROPOFOL 100 MG: 10 INJECTION, EMULSION INTRAVENOUS at 07:33

## 2024-03-07 RX ADMIN — PROPOFOL 50 MG: 10 INJECTION, EMULSION INTRAVENOUS at 08:08

## 2024-03-07 RX ADMIN — LIDOCAINE HYDROCHLORIDE 100 MG: 20 INJECTION, SOLUTION EPIDURAL; INFILTRATION; INTRACAUDAL; PERINEURAL at 07:33

## 2024-03-07 RX ADMIN — PROPOFOL 50 MG: 10 INJECTION, EMULSION INTRAVENOUS at 07:41

## 2024-03-07 RX ADMIN — PROPOFOL 50 MG: 10 INJECTION, EMULSION INTRAVENOUS at 07:58

## 2024-03-07 SDOH — HEALTH STABILITY: MENTAL HEALTH: CURRENT SMOKER: 1

## 2024-03-07 ASSESSMENT — PAIN SCALES - GENERAL
PAIN_LEVEL: 0
PAINLEVEL_OUTOF10: 0 - NO PAIN

## 2024-03-07 ASSESSMENT — PAIN - FUNCTIONAL ASSESSMENT
PAIN_FUNCTIONAL_ASSESSMENT: 0-10

## 2024-03-07 ASSESSMENT — COLUMBIA-SUICIDE SEVERITY RATING SCALE - C-SSRS
6. HAVE YOU EVER DONE ANYTHING, STARTED TO DO ANYTHING, OR PREPARED TO DO ANYTHING TO END YOUR LIFE?: NO
1. IN THE PAST MONTH, HAVE YOU WISHED YOU WERE DEAD OR WISHED YOU COULD GO TO SLEEP AND NOT WAKE UP?: NO
2. HAVE YOU ACTUALLY HAD ANY THOUGHTS OF KILLING YOURSELF?: NO

## 2024-03-07 NOTE — DISCHARGE INSTRUCTIONS

## 2024-03-07 NOTE — ANESTHESIA PREPROCEDURE EVALUATION
Patient: Buffy Damon    Procedure Information       Date/Time: 03/07/24 0730    Scheduled providers: Attila Patel MD    Procedures:       COLONOSCOPY      EGD    Location: [facility]            Relevant Problems   Cardiovascular   (+) Electrocardiogram abnormal   (+) Hyperlipidemia   (+) Primary hypertension      Endocrine   (+) Obesity      Neuro/Psych   (+) Anxiety disorder, unspecified   (+) Depression   (+) Lumbar radiculopathy   (+) Seizures (CMS/HCC)      Musculoskeletal   (+) Chronic low back pain   (+) Degenerative tear of lateral meniscus of left knee   (+) Other spondylosis with radiculopathy, lumbar region   (+) Primary osteoarthritis of left knee   (+) Spinal stenosis of lumbar region      Other   (+) Arthritis       Clinical information reviewed:   Tobacco  Allergies  Meds   Med Hx  Surg Hx  OB Status  Fam Hx  Soc   Hx        NPO Detail:  NPO/Void Status  Date of Last Liquid: 03/07/24  Time of Last Liquid: 0400  Date of Last Solid: 03/06/24  Time of Last Solid: 0600         Physical Exam    Airway  Mallampati: II     Cardiovascular - normal exam     Dental        Pulmonary   (+) wheezes     Abdominal - normal exam             Anesthesia Plan    History of general anesthesia?: yes  History of complications of general anesthesia?: no    ASA 3     MAC     The patient is a current smoker.  Patient was previously instructed to abstain from smoking on day of procedure.  Patient did not smoke on day of procedure.  Education provided regarding risk of obstructive sleep apnea.  intravenous induction   Anesthetic plan and risks discussed with patient.

## 2024-03-07 NOTE — H&P
"History Of Present Illness  Buffy Damon is a 58 y.o. female presenting for an EGD for epigastric pain and a colonoscopy for previous poor prep colonoscopy performed elsewhere.     Past Medical History  Past Medical History:   Diagnosis Date    Dyslipidemia 11/27/2023    Electrocardiogram abnormal 11/27/2023    Hyperlipidemia 10/05/2007    Primary hypertension 01/05/2004    Seizure (CMS/HCC)     last one in 2014       Surgical History  Past Surgical History:   Procedure Laterality Date    BACK SURGERY      CHOLECYSTECTOMY      HYSTERECTOMY          Social History  She reports that she has been smoking cigarettes. She has a 40.00 pack-year smoking history. She has been exposed to tobacco smoke. She has never used smokeless tobacco. She reports that she does not currently use alcohol. She reports that she does not currently use drugs.    Family History  Family History   Problem Relation Name Age of Onset    No Known Problems Mother      No Known Problems Father      Cervical cancer Sister      Breast cancer Paternal Grandmother Dania lopez     Cancer Paternal Grandmother Dania lopez     Diabetes type I Maternal Grandmother Lourdes tee         Allergies  Morphine    Review of Systems   All other systems reviewed and are negative.       Physical Exam  Constitutional:       Appearance: Normal appearance.   Cardiovascular:      Heart sounds: Normal heart sounds.   Pulmonary:      Breath sounds: Normal breath sounds and air entry.   Abdominal:      General: Abdomen is flat.      Palpations: Abdomen is soft.      Tenderness: There is no abdominal tenderness.   Neurological:      Mental Status: She is alert.          Last Recorded Vitals  Blood pressure 119/70, pulse 70, temperature 36.3 °C (97.3 °F), temperature source Temporal, resp. rate 19, height 1.575 m (5' 2\"), weight 77.1 kg (170 lb), SpO2 100 %.      ESOPHAGOGASTRODUODENOSCOPY/ COLONOSCOPY, BIOPSIES.  Risks include, but not limited to pain, infection, " bleeding,perforation, missed lesions, incomplete colonoscopy,aspiration, risk of cardiac, pulmonary, neurologic, locomotor, anesthetic events, other unforeseen complications including death.  Attila Patel MD

## 2024-03-07 NOTE — ANESTHESIA POSTPROCEDURE EVALUATION
Patient: Buffy Damon    Procedure Summary       Date: 03/07/24 Room / Location: [facility]    Anesthesia Start: 0728 Anesthesia Stop: 0813    Procedures:       COLONOSCOPY      EGD Diagnosis:       Screening for colon cancer      Epigastric pain    Scheduled Providers: Attila Patel MD Responsible Provider: SUE Reddy    Anesthesia Type: MAC ASA Status: 3            Anesthesia Type: MAC    Vitals Value Taken Time   /64 03/07/24 0813   Temp 36 03/07/24 0813   Pulse 78 03/07/24 0813   Resp 16 03/07/24 0813   SpO2 99 03/07/24 0813       Anesthesia Post Evaluation    Patient location during evaluation: PACU  Patient participation: complete - patient participated  Level of consciousness: awake and alert  Pain score: 0  Pain management: adequate  Airway patency: patent  Cardiovascular status: acceptable  Respiratory status: acceptable and room air  Hydration status: acceptable  Postoperative Nausea and Vomiting: none        There were no known notable events for this encounter.

## 2024-03-08 ENCOUNTER — TELEPHONE (OUTPATIENT)
Dept: CARDIOLOGY | Facility: CLINIC | Age: 59
End: 2024-03-08
Payer: MEDICARE

## 2024-03-08 ASSESSMENT — PAIN SCALES - GENERAL: PAINLEVEL_OUTOF10: 0 - NO PAIN

## 2024-03-08 NOTE — TELEPHONE ENCOUNTER
----- Message from Dior Soto MD sent at 3/8/2024  8:20 AM EST -----  Tell patient that her calcium score was not bad we still need to do the stress test.  ----- Message -----  From: [facility], Radiology Results In  Sent: 3/6/2024   8:28 AM EST  To: Dior Soto MD

## 2024-03-18 LAB
LAB AP ASR DISCLAIMER: NORMAL
LABORATORY COMMENT REPORT: NORMAL
PATH REPORT.ADDENDUM SPEC: NORMAL
PATH REPORT.FINAL DX SPEC: NORMAL
PATH REPORT.GROSS SPEC: NORMAL
PATH REPORT.TOTAL CANCER: NORMAL

## 2024-03-19 ENCOUNTER — TELEPHONE (OUTPATIENT)
Dept: SURGERY | Facility: CLINIC | Age: 59
End: 2024-03-19
Payer: MEDICARE

## 2024-03-19 NOTE — TELEPHONE ENCOUNTER
----- Message from Attila Patel MD sent at 3/19/2024  8:33 AM EDT -----  Let her know colon polyp was nothing to worry about , biopsies of stomach showed some inflammation, we are still waiting for the final analysis re helicobacter pylori infection

## 2024-08-05 ENCOUNTER — APPOINTMENT (OUTPATIENT)
Dept: CARDIOLOGY | Facility: CLINIC | Age: 59
End: 2024-08-05
Payer: MEDICARE

## 2024-08-05 VITALS
DIASTOLIC BLOOD PRESSURE: 66 MMHG | OXYGEN SATURATION: 99 % | TEMPERATURE: 98 F | SYSTOLIC BLOOD PRESSURE: 115 MMHG | WEIGHT: 168 LBS | HEART RATE: 60 BPM | BODY MASS INDEX: 30.91 KG/M2 | RESPIRATION RATE: 18 BRPM | HEIGHT: 62 IN

## 2024-08-05 DIAGNOSIS — R94.31 ELECTROCARDIOGRAM ABNORMAL: Primary | ICD-10-CM

## 2024-08-05 DIAGNOSIS — E78.5 DYSLIPIDEMIA: ICD-10-CM

## 2024-08-05 DIAGNOSIS — E78.2 MIXED HYPERLIPIDEMIA: ICD-10-CM

## 2024-08-05 DIAGNOSIS — I10 PRIMARY HYPERTENSION: ICD-10-CM

## 2024-08-05 DIAGNOSIS — R94.31 ABNORMAL EKG: ICD-10-CM

## 2024-08-05 PROBLEM — N64.9 BREAST DISORDER: Status: ACTIVE | Noted: 2024-08-05

## 2024-08-05 PROCEDURE — 3008F BODY MASS INDEX DOCD: CPT | Performed by: INTERNAL MEDICINE

## 2024-08-05 PROCEDURE — 3074F SYST BP LT 130 MM HG: CPT | Performed by: INTERNAL MEDICINE

## 2024-08-05 PROCEDURE — 99214 OFFICE O/P EST MOD 30 MIN: CPT | Performed by: INTERNAL MEDICINE

## 2024-08-05 PROCEDURE — 3078F DIAST BP <80 MM HG: CPT | Performed by: INTERNAL MEDICINE

## 2024-08-05 RX ORDER — FLUTICASONE PROPIONATE 50 MCG
2 SPRAY, SUSPENSION (ML) NASAL
COMMUNITY
Start: 2024-02-02

## 2024-08-05 RX ORDER — CALCIPOTRIENE 50 UG/G
1 CREAM TOPICAL EVERY 12 HOURS
COMMUNITY
Start: 2023-12-12

## 2024-08-05 RX ORDER — NIRMATRELVIR AND RITONAVIR 300-100 MG
KIT ORAL
COMMUNITY
Start: 2024-06-18

## 2024-08-05 RX ORDER — CETIRIZINE HYDROCHLORIDE 10 MG/1
TABLET ORAL
COMMUNITY
Start: 2024-02-02

## 2024-08-05 RX ORDER — METHYLPREDNISOLONE 4 MG/1
TABLET ORAL
COMMUNITY
Start: 2024-03-13

## 2024-08-05 RX ORDER — LORAZEPAM 0.5 MG/1
TABLET ORAL
COMMUNITY
Start: 2024-05-22

## 2024-08-05 ASSESSMENT — PATIENT HEALTH QUESTIONNAIRE - PHQ9
1. LITTLE INTEREST OR PLEASURE IN DOING THINGS: NOT AT ALL
2. FEELING DOWN, DEPRESSED OR HOPELESS: NOT AT ALL
SUM OF ALL RESPONSES TO PHQ9 QUESTIONS 1 AND 2: 0

## 2024-08-05 ASSESSMENT — LIFESTYLE VARIABLES
HAVE YOU OR SOMEONE ELSE BEEN INJURED AS A RESULT OF YOUR DRINKING: NO
HOW OFTEN DO YOU HAVE SIX OR MORE DRINKS ON ONE OCCASION: NEVER
HOW MANY STANDARD DRINKS CONTAINING ALCOHOL DO YOU HAVE ON A TYPICAL DAY: PATIENT DOES NOT DRINK
AUDIT-C TOTAL SCORE: 0
AUDIT TOTAL SCORE: 0
HAS A RELATIVE, FRIEND, DOCTOR, OR ANOTHER HEALTH PROFESSIONAL EXPRESSED CONCERN ABOUT YOUR DRINKING OR SUGGESTED YOU CUT DOWN: NO
SKIP TO QUESTIONS 9-10: 1
HOW OFTEN DO YOU HAVE A DRINK CONTAINING ALCOHOL: NEVER

## 2024-08-05 ASSESSMENT — ENCOUNTER SYMPTOMS
LOSS OF SENSATION IN FEET: 0
DEPRESSION: 0
OCCASIONAL FEELINGS OF UNSTEADINESS: 0

## 2024-08-05 ASSESSMENT — PAIN SCALES - GENERAL: PAINLEVEL: 0-NO PAIN

## 2024-08-05 NOTE — PROGRESS NOTES
History of present illness:  This is a very pleasant 58-year-old female with history of for hypertension, hyperlipidemia, smoking. Patient follows up in my office on abnormal EKG. Underwent lumbar laminectomy few months ago which was uneventful. At that time I cleared her for surgery. However her EKG was showing inferior Q waves.  Patient underwent in February 2024 treadmill stress test showed no ischemia with good effort.  She had a coronary calcium score with total score of 67 points distributed in the LAD and RCA.  Patient was found also to have a small aortic aneurysm of the descending aorta of 3.2 cm.  Patient returns to my office for follow-up.  Has been complaining of shoulder pain constant worse with movement of the arm related to musculoskeletal pain.  No shortness of breath dizziness lightheadedness or syncope.   Past Medical History:   Diagnosis Date    Dyslipidemia 11/27/2023    Electrocardiogram abnormal 11/27/2023    Hyperlipidemia 10/05/2007    Primary hypertension 01/05/2004    Seizure (Multi)     last one in 2014       Past Surgical History:   Procedure Laterality Date    BACK SURGERY      CHOLECYSTECTOMY      HYSTERECTOMY         Allergies   Allergen Reactions    Morphine Nausea And Vomiting, Other and Nausea/vomiting        reports that she has quit smoking. Her smoking use included cigarettes. She started smoking about 12 years ago. She has a 9.4 pack-year smoking history. She has never been exposed to tobacco smoke. She has never used smokeless tobacco. She reports that she does not currently use alcohol. She reports that she does not currently use drugs.    Family History   Problem Relation Name Age of Onset    No Known Problems Mother      No Known Problems Father      Cervical cancer Sister      Breast cancer Paternal Grandmother Dania lopez     Cancer Paternal Grandmother Dania jessica     Diabetes type I Maternal Grandmother Lourdes tee        Patient's Medications   New Prescriptions    No  medications on file   Previous Medications    ATORVASTATIN (LIPITOR) 40 MG TABLET    Take 1 tablet (40 mg) by mouth once daily.    CALCIPOTRIENE (DOVONEX) 0.005 % CREAM    Apply 1 Application topically every 12 hours.    CETIRIZINE (ZYRTEC) 10 MG TABLET    Take by mouth.    COPPER GLUCONATE 2 MG TABLET        DULOXETINE (CYMBALTA) 60 MG DR CAPSULE    Take 1 capsule (60 mg) by mouth once daily.    ERGOCALCIFEROL (VITAMIN D-2) 1.25 MG (45894 UT) CAPSULE    Take 1 capsule (50,000 Units) by mouth once a week.    ESCITALOPRAM (LEXAPRO) 20 MG TABLET    Take 1 tablet (20 mg) by mouth once daily.    FLUTICASONE (FLONASE) 50 MCG/ACTUATION NASAL SPRAY    2 sprays once daily.    IBUPROFEN 600 MG TABLET    Take 1 tablet (600 mg) by mouth 3 times a day. TAKE 1 TABLET BY MOUTH THREE TIMES DAILY with food or milk THREE TIMES DAILY AS NEEDED for TEN days    LORAZEPAM (ATIVAN) 0.5 MG TABLET        MELATONIN 1 MG TABLET,CHEWABLE    Chew.    MELOXICAM (MOBIC) 15 MG TABLET    Take 1 tablet (15 mg) by mouth once daily.    METHYLPREDNISOLONE (MEDROL DOSPAK) 4 MG TABLETS    As instructed per package    MIRTAZAPINE (REMERON) 30 MG TABLET        PANTOPRAZOLE (PROTONIX) 40 MG EC TABLET    Take 1 tablet (40 mg) by mouth once daily in the morning. Take before meals. Do not crush, chew, or split.    PAXLOVID 300 MG (150 MG X 2)-100 MG TABLET THERAPY PACK    ADMSINISTER 2 PINK NIRMATRELVIR 150 MG TABLETS AND ONE WHITE RITONAVIR 100 MG TABLET FOR A TOTAL OF 3 TABLETS TWICE DAILY    TURMERIC 400 MG CAPSULE    Take by mouth.    VARENICLINE (CHANTIX) 1 MG TABLET    Take 1 tablet (1 mg) by mouth twice a day.    VITAMIN E 450 MG (1000 UNIT) CAPSULE    Take by mouth once daily.   Modified Medications    No medications on file   Discontinued Medications    No medications on file       Objective   Physical Exam  General: Patient in no acute distress   HEENT: Atraumatic normocephalic.  Neck: Supple, jugular venous pressure within normal limit.  No  bruits  Lungs: Clear to auscultation bilaterally  Cardiovascular: Regular rate and rhythm, normal heart sounds, no murmurs rubs or gallops  Abdomen: Soft nontender nondistended.  Normal bowel sounds.  Extremities: Warm to touch, no edema.      Lab Review   No visits with results within 2 Month(s) from this visit.   Latest known visit with results is:   Hospital Outpatient Visit on 03/07/2024   Component Date Value    Case Report 03/07/2024                      Value:Surgical Pathology                                Case: [id number]                                  Authorizing Provider:  Attila Patel MD         Collected:           03/07/2024 0735              Ordering Location:     [facility]   Received:            03/07/2024 0818              Pathologist:           Vilma Matias MD PhD                                                              Specimens:   A) - DUODENUM SECOND PART BIOPSY                                                                    B) - STOMACH ANTRUM BIOPSY                                                                          C) - RECTUM POLYPECTOMY                                                                    FINAL DIAGNOSIS 03/07/2024                      Value:A.  Duodenum, biopsy:                          -Small intestinal mucosa with reactive features.                                                    B.  Stomach, biopsy:                          -Predominant oxyntic mucosa with diffuse erosion and chronic active                           inflammation.                          -An immunostain for H. pylori is pending and the result will follow in an                           addendum.                                                    C.  Rectum, polyp, polypectomy:                          -Colorectal mucosa with lymphoid aggregate(s) and hyperplastic features.      03/07/2024                      Value:By the signature on this report, the individual or group  "listed as making                           the Final Interpretation/Diagnosis certifies that they have reviewed this                           case.     Addendum 03/07/2024                      Value:This report has been issued to add results of immunohistochemical or                           special stains.                                                     Original diagnosis remains unchanged.                                                    Part A:                           Focal intestinal metaplasia is identified. An immunostain for H. pylori is                           negative.     Gross Description 03/07/2024                      Value:A: Received in formalin, labeled with the patient's name and hospital                           number and \"1, duodenal 2 biopsy\", are multiple fragments of tan, soft                           tissue aggregating to 1.0 x 0.3 x 0.2 cm. The specimen is submitted in                           toto in one cassette.                          [facility]                          B: Received in formalin, labeled with the patient's name and hospital                           number and \"2, stomach antrum biopsy\", are multiple fragments of tan, soft                           tissue aggregating to 1.2 x 0.3 x 0.2 cm. The specimen is submitted in                           toto in one cassette.                          [facility]                          C: Received in formalin, labeled with the patient's name and hospital                           number and \"3, rectal polyp\", is a fragment of tan, soft tissue measuring                           0.3 x 0.2 x 0.2 cm. The specimen is submitted in toto in one cassette.                          [facility]    Disclaimer 03/07/2024                      Value:One or more of the reagents used to perform assays on this specimen MAY                           have contained components considered to be analyte specific reagents                           (ASR's).  ASR's " have not been cleared or approved by the U.S. Food and                           Drug Administration.  These assays were developed and their performance                           characteristics determined by the Department of Pathology at [facility]. The FDA does not require this test to                           go through premarket FDA review. This test is used for clinical purposes.                           It should not be regarded as investigational or for research. This                           laboratory is certified under the Clinical Laboratory Improvement                           Amendments (CLIA) as qualified to perform high complexity clinical                           laboratory testing.  The assays were performed with appropriate positive                           and negative controls which stained appropriately.        Assessment/Plan   Patient Active Problem List   Diagnosis    Anxiety disorder, unspecified    Arthritis    Chronic back pain    Chronic low back pain    Smoker    Continuous dependence on cigarette smoking    Depression    Dyslipidemia    Electrocardiogram abnormal    Fibroids, submucosal    Headache    Hematoma of left lower extremity    Hyperlipidemia    Insomnia    Medial epicondylitis of elbow, left    Myofascial pain    Neuropathic pain    Obesity with body mass index 30 or greater    Obesity    Severe obesity (BMI >= 40) (Multi)    Other spondylosis with radiculopathy, lumbar region    Pain in left knee    Right knee pain    Patellofemoral stress syndrome of left knee    Primary hypertension    Primary osteoarthritis of left knee    Degenerative tear of lateral meniscus of left knee    Seizures (Multi)    Lumbar radiculopathy    Skin lesion of back    Spinal stenosis of lumbar region    Tobacco dependence    Trochanteric bursitis of right hip    Epigastric pain    Breast disorder      This is a very pleasant  58-year-old female with history of for hypertension, hyperlipidemia, smoking. Patient follows up in my office on abnormal EKG. Underwent lumbar laminectomy few months ago which was uneventful. At that time I cleared her for surgery. However her EKG was showing inferior Q waves.  Patient underwent in February 2024 treadmill stress test showed no ischemia with good effort.  She had a coronary calcium score with total score of 67 points distributed in the LAD and RCA.  Patient was found also to have a small aortic aneurysm of the descending aorta of 3.2 cm.  Patient returns to my office for follow-up.  Has been complaining of shoulder pain constant worse with movement of the arm related to musculoskeletal pain.  No shortness of breath dizziness lightheadedness or syncope.  Patient is on atorvastatin blood pressure and heart rate well-controlled.  Recommend to continue current medications.  Follow-up with her primary care physician for musculoskeletal neck and shoulder pain.  We will repeat another CAT scan in February or March next year.      Dior Soto MD

## 2024-08-19 ENCOUNTER — APPOINTMENT (OUTPATIENT)
Dept: RADIOLOGY | Facility: HOSPITAL | Age: 59
End: 2024-08-19
Payer: MEDICARE

## 2024-08-19 ENCOUNTER — HOSPITAL ENCOUNTER (EMERGENCY)
Facility: HOSPITAL | Age: 59
Discharge: HOME | End: 2024-08-19
Attending: EMERGENCY MEDICINE
Payer: MEDICARE

## 2024-08-19 VITALS
SYSTOLIC BLOOD PRESSURE: 107 MMHG | TEMPERATURE: 96.5 F | DIASTOLIC BLOOD PRESSURE: 71 MMHG | WEIGHT: 169.09 LBS | BODY MASS INDEX: 31.12 KG/M2 | RESPIRATION RATE: 16 BRPM | HEIGHT: 62 IN | OXYGEN SATURATION: 100 % | HEART RATE: 67 BPM

## 2024-08-19 DIAGNOSIS — S92.512A CLOSED DISPLACED FRACTURE OF PROXIMAL PHALANX OF LESSER TOE OF LEFT FOOT, INITIAL ENCOUNTER: Primary | ICD-10-CM

## 2024-08-19 DIAGNOSIS — S43.402A SPRAIN OF LEFT SHOULDER, UNSPECIFIED SHOULDER SPRAIN TYPE, INITIAL ENCOUNTER: ICD-10-CM

## 2024-08-19 PROCEDURE — 73630 X-RAY EXAM OF FOOT: CPT | Mod: LEFT SIDE | Performed by: RADIOLOGY

## 2024-08-19 PROCEDURE — 73030 X-RAY EXAM OF SHOULDER: CPT | Mod: LEFT SIDE | Performed by: RADIOLOGY

## 2024-08-19 PROCEDURE — 99284 EMERGENCY DEPT VISIT MOD MDM: CPT

## 2024-08-19 PROCEDURE — 73630 X-RAY EXAM OF FOOT: CPT | Mod: LT

## 2024-08-19 PROCEDURE — 73030 X-RAY EXAM OF SHOULDER: CPT | Mod: LT

## 2024-08-19 RX ORDER — IBUPROFEN 600 MG/1
600 TABLET ORAL EVERY 6 HOURS PRN
Qty: 28 TABLET | Refills: 0 | Status: SHIPPED | OUTPATIENT
Start: 2024-08-19 | End: 2024-08-26

## 2024-08-19 ASSESSMENT — PAIN DESCRIPTION - LOCATION: LOCATION: FOOT

## 2024-08-19 ASSESSMENT — PAIN - FUNCTIONAL ASSESSMENT: PAIN_FUNCTIONAL_ASSESSMENT: 0-10

## 2024-08-19 ASSESSMENT — PAIN DESCRIPTION - ORIENTATION: ORIENTATION: LEFT

## 2024-08-19 ASSESSMENT — PAIN SCALES - GENERAL: PAINLEVEL_OUTOF10: 5 - MODERATE PAIN

## 2024-08-19 NOTE — ED PROVIDER NOTES
HPI   Chief Complaint   Patient presents with    Foot Injury     Pt. States she kicked the metal bar on her bed 2 weeks ago with her left foot.  States she still has continual pain to her left foot from behind the toes up into the top of her foot.  Pt. Also c/o left shoulder pain, denies any injury but states it is difficulty to move the arm and sleep on her left side d/t pain.       58-year-old female presents for evaluation of left foot pain and left shoulder pain.  She accidentally kicked a bed frame 2 weeks ago when she was changing the sheets.  Constant left foot pain.  She also has atraumatic left shoulder pain.      History provided by:  Patient          Patient History   Past Medical History:   Diagnosis Date    Dyslipidemia 11/27/2023    Electrocardiogram abnormal 11/27/2023    Hyperlipidemia 10/05/2007    Primary hypertension 01/05/2004    Seizure (Multi)     last one in 2014     Past Surgical History:   Procedure Laterality Date    BACK SURGERY      CHOLECYSTECTOMY      HYSTERECTOMY       Family History   Problem Relation Name Age of Onset    No Known Problems Mother      No Known Problems Father      Cervical cancer Sister      Breast cancer Paternal Grandmother Dania lopez     Cancer Paternal Grandmother Dania lopez     Diabetes type I Maternal Grandmother Lourdes tee      Social History     Tobacco Use    Smoking status: Every Day     Current packs/day: 0.50     Average packs/day: 0.7 packs/day for 12.6 years (8.3 ttl pk-yrs)     Types: Cigarettes     Start date: 2012     Passive exposure: Never    Smokeless tobacco: Never    Tobacco comments:     Pt is ready to quit, she states she currently vapes    Vaping Use    Vaping status: Every Day    Substances: Nicotine, nicotine free    Devices: Disposable   Substance Use Topics    Alcohol use: Not Currently    Drug use: Not Currently       Physical Exam   ED Triage Vitals [08/19/24 1057]   Temperature Heart Rate Respirations BP   35.8 °C (96.5 °F) 80 16  106/73      Pulse Ox Temp Source Heart Rate Source Patient Position   95 % Temporal Monitor Sitting      BP Location FiO2 (%)     Right arm --       Physical Exam  Vitals and nursing note reviewed.   Constitutional:       General: She is not in acute distress.     Appearance: She is well-developed.   HENT:      Head: Normocephalic and atraumatic.   Eyes:      Conjunctiva/sclera: Conjunctivae normal.   Cardiovascular:      Rate and Rhythm: Normal rate and regular rhythm.      Heart sounds: No murmur heard.  Pulmonary:      Effort: Pulmonary effort is normal. No respiratory distress.      Breath sounds: Normal breath sounds.   Abdominal:      Palpations: Abdomen is soft.      Tenderness: There is no abdominal tenderness.   Musculoskeletal:         General: Tenderness present. No swelling or deformity.      Cervical back: Neck supple.      Comments: Left shoulder tenderness without limitations of range of motion.  Left foot tenderness.   Skin:     General: Skin is warm and dry.      Capillary Refill: Capillary refill takes less than 2 seconds.   Neurological:      Mental Status: She is alert.   Psychiatric:         Mood and Affect: Mood normal.           ED Course & MDM   ED Course as of 08/22/24 0712   Mon Aug 19, 2024   1108 58-year-old female with left foot pain and left shoulder pain.  X-ray left shoulder and left foot. [BT]   1209 XR foot left 3+ views  IMPRESSION:  Minimally displaced, acute-subacute fracture at the base of the  proximal phalanx of the 5th digit.    Noted [BT]   1210 XR shoulder left 2+ views  IMPRESSION:  No evidence for acute osseous abnormality [BT]   1220 XR foot left 3+ views  Proximal phalanx fracture left fifth toe.  The toes were yadira taped and she was put in a surgical shoe.  Right shoulder negative for fractures.  Motrin for pain.  Referred to podiatry for follow-up.  Advised to return with intractable foot pain or any other concerns.  Patient agreeable with plan and verbalized  understanding.  Discharged home. [BT]      ED Course User Index  [BT] Trent Kingston DO         Diagnoses as of 08/22/24 0712   Closed displaced fracture of proximal phalanx of lesser toe of left foot, initial encounter   Sprain of left shoulder, unspecified shoulder sprain type, initial encounter                 No data recorded     [place] Coma Scale Score: 15 (08/19/24 1106 : Anu Welsh, KIARA)                           Medical Decision Making      Procedure  Procedures         Trent Kingston DO  08/22/24 0713

## 2024-10-07 ENCOUNTER — LAB (OUTPATIENT)
Dept: LAB | Facility: LAB | Age: 59
End: 2024-10-07
Payer: MEDICARE

## 2024-10-07 DIAGNOSIS — D18.01 HEMANGIOMA OF SKIN AND SUBCUTANEOUS TISSUE: ICD-10-CM

## 2024-10-07 DIAGNOSIS — L57.8 OTHER SKIN CHANGES DUE TO CHRONIC EXPOSURE TO NONIONIZING RADIATION: ICD-10-CM

## 2024-10-07 DIAGNOSIS — L40.0 PSORIASIS VULGARIS: Primary | ICD-10-CM

## 2024-10-07 DIAGNOSIS — L82.1 OTHER SEBORRHEIC KERATOSIS: ICD-10-CM

## 2024-10-07 DIAGNOSIS — D22.5 MELANOCYTIC NEVI OF TRUNK: ICD-10-CM

## 2024-10-07 PROCEDURE — 36415 COLL VENOUS BLD VENIPUNCTURE: CPT

## 2024-10-07 PROCEDURE — 86481 TB AG RESPONSE T-CELL SUSP: CPT

## 2024-10-09 LAB
NIL(NEG) CONTROL SPOT COUNT: NORMAL
PANEL A SPOT COUNT: 0
PANEL B SPOT COUNT: 0
POS CONTROL SPOT COUNT: NORMAL
T-SPOT. TB INTERPRETATION: NEGATIVE

## 2024-10-28 ENCOUNTER — HOSPITAL ENCOUNTER (OUTPATIENT)
Dept: RADIOLOGY | Facility: HOSPITAL | Age: 59
Discharge: HOME | End: 2024-10-28
Payer: MEDICARE

## 2024-10-28 VITALS — HEIGHT: 62 IN | BODY MASS INDEX: 30.36 KG/M2 | WEIGHT: 165 LBS

## 2024-10-28 DIAGNOSIS — Z12.31 ENCOUNTER FOR SCREENING MAMMOGRAM FOR MALIGNANT NEOPLASM OF BREAST: ICD-10-CM

## 2024-10-28 PROCEDURE — 77063 BREAST TOMOSYNTHESIS BI: CPT

## 2024-10-28 PROCEDURE — 77063 BREAST TOMOSYNTHESIS BI: CPT | Performed by: RADIOLOGY

## 2024-10-28 PROCEDURE — 77067 SCR MAMMO BI INCL CAD: CPT | Performed by: RADIOLOGY

## 2024-11-04 ENCOUNTER — LAB (OUTPATIENT)
Dept: LAB | Facility: LAB | Age: 59
End: 2024-11-04
Payer: MEDICARE

## 2024-11-04 DIAGNOSIS — E61.0 COPPER DEFICIENCY: ICD-10-CM

## 2024-11-04 DIAGNOSIS — F41.1 GENERALIZED ANXIETY DISORDER: Primary | ICD-10-CM

## 2024-11-04 LAB
ALBUMIN SERPL BCP-MCNC: 4.3 G/DL (ref 3.4–5)
ALP SERPL-CCNC: 93 U/L (ref 33–110)
ALT SERPL W P-5'-P-CCNC: 18 U/L (ref 7–45)
ANION GAP SERPL CALC-SCNC: 10 MMOL/L (ref 10–20)
AST SERPL W P-5'-P-CCNC: 17 U/L (ref 9–39)
BASOPHILS # BLD AUTO: 0.03 X10*3/UL (ref 0–0.1)
BASOPHILS NFR BLD AUTO: 0.5 %
BILIRUB SERPL-MCNC: 0.5 MG/DL (ref 0–1.2)
BUN SERPL-MCNC: 12 MG/DL (ref 6–23)
CALCIUM SERPL-MCNC: 9.6 MG/DL (ref 8.6–10.3)
CHLORIDE SERPL-SCNC: 103 MMOL/L (ref 98–107)
CO2 SERPL-SCNC: 32 MMOL/L (ref 21–32)
CREAT SERPL-MCNC: 0.8 MG/DL (ref 0.5–1.05)
EGFRCR SERPLBLD CKD-EPI 2021: 86 ML/MIN/1.73M*2
EOSINOPHIL # BLD AUTO: 0.08 X10*3/UL (ref 0–0.7)
EOSINOPHIL NFR BLD AUTO: 1.3 %
ERYTHROCYTE [DISTWIDTH] IN BLOOD BY AUTOMATED COUNT: 13.5 % (ref 11.5–14.5)
GLUCOSE SERPL-MCNC: 117 MG/DL (ref 74–99)
HCT VFR BLD AUTO: 40.9 % (ref 36–46)
HGB BLD-MCNC: 13.2 G/DL (ref 12–16)
IMM GRANULOCYTES # BLD AUTO: 0.04 X10*3/UL (ref 0–0.7)
IMM GRANULOCYTES NFR BLD AUTO: 0.7 % (ref 0–0.9)
LYMPHOCYTES # BLD AUTO: 3.16 X10*3/UL (ref 1.2–4.8)
LYMPHOCYTES NFR BLD AUTO: 53.1 %
MCH RBC QN AUTO: 30.3 PG (ref 26–34)
MCHC RBC AUTO-ENTMCNC: 32.3 G/DL (ref 32–36)
MCV RBC AUTO: 94 FL (ref 80–100)
MONOCYTES # BLD AUTO: 0.48 X10*3/UL (ref 0.1–1)
MONOCYTES NFR BLD AUTO: 8.1 %
NEUTROPHILS # BLD AUTO: 2.16 X10*3/UL (ref 1.2–7.7)
NEUTROPHILS NFR BLD AUTO: 36.3 %
NRBC BLD-RTO: 0 /100 WBCS (ref 0–0)
PLATELET # BLD AUTO: 297 X10*3/UL (ref 150–450)
POTASSIUM SERPL-SCNC: 5.5 MMOL/L (ref 3.5–5.3)
PROT SERPL-MCNC: 6.3 G/DL (ref 6.4–8.2)
RBC # BLD AUTO: 4.36 X10*6/UL (ref 4–5.2)
SODIUM SERPL-SCNC: 139 MMOL/L (ref 136–145)
WBC # BLD AUTO: 6 X10*3/UL (ref 4.4–11.3)

## 2024-11-04 PROCEDURE — 36415 COLL VENOUS BLD VENIPUNCTURE: CPT

## 2024-11-04 PROCEDURE — 82525 ASSAY OF COPPER: CPT

## 2024-11-06 LAB — COPPER SERPL-MCNC: 68.5 UG/DL (ref 80–155)

## 2025-01-23 DIAGNOSIS — R10.13 EPIGASTRIC PAIN: ICD-10-CM

## 2025-01-23 RX ORDER — PANTOPRAZOLE SODIUM 40 MG/1
40 TABLET, DELAYED RELEASE ORAL
Qty: 30 TABLET | Refills: 0 | Status: SHIPPED | OUTPATIENT
Start: 2025-01-23 | End: 2025-02-22

## 2025-02-04 ENCOUNTER — APPOINTMENT (OUTPATIENT)
Dept: CARDIOLOGY | Facility: CLINIC | Age: 60
End: 2025-02-04
Payer: MEDICARE

## 2025-03-05 DIAGNOSIS — R10.13 EPIGASTRIC PAIN: ICD-10-CM

## 2025-03-05 RX ORDER — PANTOPRAZOLE SODIUM 40 MG/1
40 TABLET, DELAYED RELEASE ORAL
Qty: 30 TABLET | Refills: 0 | OUTPATIENT
Start: 2025-03-05 | End: 2025-04-04

## 2025-03-25 ENCOUNTER — APPOINTMENT (OUTPATIENT)
Dept: CARDIOLOGY | Facility: CLINIC | Age: 60
End: 2025-03-25
Payer: MEDICARE

## 2025-03-25 VITALS
HEART RATE: 76 BPM | HEIGHT: 62 IN | DIASTOLIC BLOOD PRESSURE: 63 MMHG | RESPIRATION RATE: 16 BRPM | WEIGHT: 160 LBS | OXYGEN SATURATION: 98 % | SYSTOLIC BLOOD PRESSURE: 108 MMHG | BODY MASS INDEX: 29.44 KG/M2

## 2025-03-25 DIAGNOSIS — I10 PRIMARY HYPERTENSION: ICD-10-CM

## 2025-03-25 DIAGNOSIS — E78.2 MIXED HYPERLIPIDEMIA: ICD-10-CM

## 2025-03-25 DIAGNOSIS — R94.31 ABNORMAL EKG: ICD-10-CM

## 2025-03-25 DIAGNOSIS — R94.31 ELECTROCARDIOGRAM ABNORMAL: ICD-10-CM

## 2025-03-25 DIAGNOSIS — E78.5 DYSLIPIDEMIA: ICD-10-CM

## 2025-03-25 PROCEDURE — 99213 OFFICE O/P EST LOW 20 MIN: CPT | Performed by: INTERNAL MEDICINE

## 2025-03-25 PROCEDURE — 3078F DIAST BP <80 MM HG: CPT | Performed by: INTERNAL MEDICINE

## 2025-03-25 PROCEDURE — 3074F SYST BP LT 130 MM HG: CPT | Performed by: INTERNAL MEDICINE

## 2025-03-25 PROCEDURE — 3008F BODY MASS INDEX DOCD: CPT | Performed by: INTERNAL MEDICINE

## 2025-03-25 ASSESSMENT — PATIENT HEALTH QUESTIONNAIRE - PHQ9
2. FEELING DOWN, DEPRESSED OR HOPELESS: NOT AT ALL
SUM OF ALL RESPONSES TO PHQ9 QUESTIONS 1 AND 2: 0
1. LITTLE INTEREST OR PLEASURE IN DOING THINGS: NOT AT ALL

## 2025-03-25 ASSESSMENT — LIFESTYLE VARIABLES
AUDIT TOTAL SCORE: 0
SKIP TO QUESTIONS 9-10: 1
AUDIT-C TOTAL SCORE: 0
HAVE YOU OR SOMEONE ELSE BEEN INJURED AS A RESULT OF YOUR DRINKING: NO
HAS A RELATIVE, FRIEND, DOCTOR, OR ANOTHER HEALTH PROFESSIONAL EXPRESSED CONCERN ABOUT YOUR DRINKING OR SUGGESTED YOU CUT DOWN: NO
HOW OFTEN DO YOU HAVE SIX OR MORE DRINKS ON ONE OCCASION: NEVER
HOW MANY STANDARD DRINKS CONTAINING ALCOHOL DO YOU HAVE ON A TYPICAL DAY: PATIENT DOES NOT DRINK
HOW OFTEN DO YOU HAVE A DRINK CONTAINING ALCOHOL: NEVER

## 2025-03-25 ASSESSMENT — ENCOUNTER SYMPTOMS
LOSS OF SENSATION IN FEET: 0
OCCASIONAL FEELINGS OF UNSTEADINESS: 0
DEPRESSION: 0

## 2025-03-25 ASSESSMENT — PAIN SCALES - GENERAL: PAINLEVEL_OUTOF10: 0-NO PAIN

## 2025-03-25 NOTE — PROGRESS NOTES
History of present illness:  This is a very pleasant 58-year-old female with history of for hypertension, hyperlipidemia, smoking. Patient follows up in my office on abnormal EKG was showing inferior Q waves.  Patient underwent in February 2024 treadmill stress test showed no ischemia with good effort.  She had a coronary calcium score with total score of 67 points distributed in the LAD and lcx.  Patient feeling overall okay.  Denies any chest pain or shortness of breath.  No palpitations lightheadedness or syncope.    Past Medical History:   Diagnosis Date    Depression     Dyslipidemia 11/27/2023    Electrocardiogram abnormal 11/27/2023    Hyperlipidemia 10/05/2007    Primary hypertension 01/05/2004    Seizure (Multi)     last one in 2014       Past Surgical History:   Procedure Laterality Date    BACK SURGERY      CHOLECYSTECTOMY      HYSTERECTOMY         Allergies   Allergen Reactions    Morphine Nausea And Vomiting, Other and Nausea/vomiting        reports that she has been smoking cigarettes. She started smoking about 13 years ago. She has a 15 pack-year smoking history. She has never been exposed to tobacco smoke. She uses smokeless tobacco. She reports that she does not currently use alcohol. She reports that she does not currently use drugs.    Family History   Problem Relation Name Age of Onset    No Known Problems Mother      No Known Problems Father      Cervical cancer Sister      Diabetes type I Maternal Grandmother Lourdes tee     Breast cancer Maternal Grandmother Lourdes tee     Arthritis Maternal Grandmother Lourdes tee     Breast cancer Paternal Grandmother Dania lopez     Cancer Paternal Grandmother Dania lopez     Asthma Daughter Deana franks     Learning disabilities Daughter Rachel alfred     Learning disabilities Daughter Melissa alfred        Patient's Medications   New Prescriptions    No medications on file   Previous Medications    ATORVASTATIN (LIPITOR) 40 MG TABLET    Take 1 tablet  (40 mg) by mouth once daily.    CALCIPOTRIENE (DOVONEX) 0.005 % CREAM    Apply 1 Application topically every 12 hours.    CETIRIZINE (ZYRTEC) 10 MG TABLET    Take by mouth.    COPPER GLUCONATE 2 MG TABLET        DULOXETINE (CYMBALTA) 60 MG DR CAPSULE    Take 1 capsule (60 mg) by mouth once daily.    ERGOCALCIFEROL (VITAMIN D-2) 1.25 MG (49835 UT) CAPSULE    Take 1 capsule (50,000 Units) by mouth once a week.    ESCITALOPRAM (LEXAPRO) 20 MG TABLET    Take 1 tablet (20 mg) by mouth once daily.    FLUTICASONE (FLONASE) 50 MCG/ACTUATION NASAL SPRAY    2 sprays once daily.    IBUPROFEN 600 MG TABLET    Take 1 tablet (600 mg) by mouth 3 times a day. TAKE 1 TABLET BY MOUTH THREE TIMES DAILY with food or milk THREE TIMES DAILY AS NEEDED for TEN days    LORAZEPAM (ATIVAN) 0.5 MG TABLET        MELATONIN 1 MG TABLET,CHEWABLE    Chew.    MELOXICAM (MOBIC) 15 MG TABLET    Take 1 tablet (15 mg) by mouth once daily.    METHYLPREDNISOLONE (MEDROL DOSPAK) 4 MG TABLETS    As instructed per package    MIRTAZAPINE (REMERON) 30 MG TABLET        PANTOPRAZOLE (PROTONIX) 40 MG EC TABLET    Take 1 tablet (40 mg) by mouth once daily in the morning. Take before meals. Do not crush, chew, or split.    PAXLOVID 300 MG (150 MG X 2)-100 MG TABLET THERAPY PACK    ADMSINISTER 2 PINK NIRMATRELVIR 150 MG TABLETS AND ONE WHITE RITONAVIR 100 MG TABLET FOR A TOTAL OF 3 TABLETS TWICE DAILY    TURMERIC 400 MG CAPSULE    Take by mouth.    VARENICLINE (CHANTIX) 1 MG TABLET    Take 1 tablet (1 mg) by mouth twice a day.    VITAMIN E 450 MG (1000 UNIT) CAPSULE    Take by mouth once daily.   Modified Medications    No medications on file   Discontinued Medications    No medications on file       Objective   Physical Exam  General: Patient in no acute distress   HEENT: Atraumatic normocephalic.  Neck: Supple, jugular venous pressure within normal limit.  No bruits  Lungs: Clear to auscultation bilaterally  Cardiovascular: Regular rate and rhythm, normal heart  sounds, no murmurs rubs or gallops  Abdomen: Soft nontender nondistended.  Normal bowel sounds.  Extremities: Warm to touch, no edema.      Lab Review   No visits with results within 2 Month(s) from this visit.   Latest known visit with results is:   Lab on 11/04/2024   Component Date Value    Glucose 11/04/2024 117 (H)     Sodium 11/04/2024 139     Potassium 11/04/2024 5.5 (H)     Chloride 11/04/2024 103     Bicarbonate 11/04/2024 32     Anion Gap 11/04/2024 10     Urea Nitrogen 11/04/2024 12     Creatinine 11/04/2024 0.80     eGFR 11/04/2024 86     Calcium 11/04/2024 9.6     Albumin 11/04/2024 4.3     Alkaline Phosphatase 11/04/2024 93     Total Protein 11/04/2024 6.3 (L)     AST 11/04/2024 17     Bilirubin, Total 11/04/2024 0.5     ALT 11/04/2024 18     Copper 11/04/2024 68.5 (L)     WBC 11/04/2024 6.0     nRBC 11/04/2024 0.0     RBC 11/04/2024 4.36     Hemoglobin 11/04/2024 13.2     Hematocrit 11/04/2024 40.9     MCV 11/04/2024 94     MCH 11/04/2024 30.3     MCHC 11/04/2024 32.3     RDW 11/04/2024 13.5     Platelets 11/04/2024 297     Neutrophils % 11/04/2024 36.3     Immature Granulocytes %,* 11/04/2024 0.7     Lymphocytes % 11/04/2024 53.1     Monocytes % 11/04/2024 8.1     Eosinophils % 11/04/2024 1.3     Basophils % 11/04/2024 0.5     Neutrophils Absolute 11/04/2024 2.16     Immature Granulocytes Ab* 11/04/2024 0.04     Lymphocytes Absolute 11/04/2024 3.16     Monocytes Absolute 11/04/2024 0.48     Eosinophils Absolute 11/04/2024 0.08     Basophils Absolute 11/04/2024 0.03         Assessment/Plan   Patient Active Problem List   Diagnosis    Anxiety disorder, unspecified    Arthritis    Chronic back pain    Chronic low back pain    Smoker    Continuous dependence on cigarette smoking    Depression    Dyslipidemia    Electrocardiogram abnormal    Fibroids, submucosal    Headache    Hematoma of left lower extremity    Hyperlipidemia    Insomnia    Medial epicondylitis of elbow, left    Myofascial pain     Neuropathic pain    Obesity with body mass index 30 or greater    Obesity    Severe obesity (BMI >= 40) (Multi)    Other spondylosis with radiculopathy, lumbar region    Pain in left knee    Right knee pain    Patellofemoral stress syndrome of left knee    Primary hypertension    Primary osteoarthritis of left knee    Degenerative tear of lateral meniscus of left knee    Seizures (Multi)    Lumbar radiculopathy    Skin lesion of back    Spinal stenosis of lumbar region    Tobacco dependence    Trochanteric bursitis of right hip    Epigastric pain    Breast disorder      This is a very pleasant 58-year-old female with history of for hypertension, hyperlipidemia, smoking. Patient follows up in my office on abnormal EKG was showing inferior Q waves.  Patient underwent in February 2024 treadmill stress test showed no ischemia with good effort.  She had a coronary calcium score with total score of 67 points distributed in the LAD and lcx.  Patient feeling overall okay.  Denies any chest pain or shortness of breath.  No palpitations lightheadedness or syncope.    Patient stable cardiac wise.  I discussed with her smoking cessation and lifestyle modification.  I will follow-up in 1 year.      Dior Soto MD       \

## 2025-08-04 ENCOUNTER — APPOINTMENT (OUTPATIENT)
Dept: RADIOLOGY | Facility: HOSPITAL | Age: 60
End: 2025-08-04
Payer: MEDICARE

## 2025-08-04 DIAGNOSIS — Z78.0 ASYMPTOMATIC MENOPAUSAL STATE: ICD-10-CM

## 2025-08-04 PROCEDURE — 77080 DXA BONE DENSITY AXIAL: CPT

## 2025-08-04 PROCEDURE — 77080 DXA BONE DENSITY AXIAL: CPT | Performed by: RADIOLOGY
